# Patient Record
Sex: FEMALE | Race: OTHER | HISPANIC OR LATINO | Employment: PART TIME | URBAN - METROPOLITAN AREA
[De-identification: names, ages, dates, MRNs, and addresses within clinical notes are randomized per-mention and may not be internally consistent; named-entity substitution may affect disease eponyms.]

---

## 2019-12-16 ENCOUNTER — LAB VISIT (OUTPATIENT)
Dept: LAB | Facility: HOSPITAL | Age: 29
End: 2019-12-16
Attending: MIDWIFE
Payer: MEDICAID

## 2019-12-16 ENCOUNTER — OFFICE VISIT (OUTPATIENT)
Dept: OBSTETRICS AND GYNECOLOGY | Facility: CLINIC | Age: 29
End: 2019-12-16
Payer: MEDICAID

## 2019-12-16 VITALS
SYSTOLIC BLOOD PRESSURE: 112 MMHG | BODY MASS INDEX: 41.42 KG/M2 | WEIGHT: 257.69 LBS | DIASTOLIC BLOOD PRESSURE: 76 MMHG | HEIGHT: 66 IN

## 2019-12-16 DIAGNOSIS — Z32.01 POSITIVE PREGNANCY TEST: ICD-10-CM

## 2019-12-16 DIAGNOSIS — N91.2 AMENORRHEA: Primary | ICD-10-CM

## 2019-12-16 DIAGNOSIS — O26.841 UTERINE SIZE-DATE DISCREPANCY IN FIRST TRIMESTER: ICD-10-CM

## 2019-12-16 LAB
ABO + RH BLD: NORMAL
BASOPHILS # BLD AUTO: 0.05 K/UL (ref 0–0.2)
BASOPHILS NFR BLD: 0.5 % (ref 0–1.9)
BLD GP AB SCN CELLS X3 SERPL QL: NORMAL
DIFFERENTIAL METHOD: ABNORMAL
EOSINOPHIL # BLD AUTO: 0.1 K/UL (ref 0–0.5)
EOSINOPHIL NFR BLD: 0.6 % (ref 0–8)
ERYTHROCYTE [DISTWIDTH] IN BLOOD BY AUTOMATED COUNT: 14 % (ref 11.5–14.5)
HCT VFR BLD AUTO: 41.3 % (ref 37–48.5)
HGB BLD-MCNC: 13 G/DL (ref 12–16)
IMM GRANULOCYTES # BLD AUTO: 0.02 K/UL (ref 0–0.04)
IMM GRANULOCYTES NFR BLD AUTO: 0.2 % (ref 0–0.5)
LYMPHOCYTES # BLD AUTO: 2.5 K/UL (ref 1–4.8)
LYMPHOCYTES NFR BLD: 23.8 % (ref 18–48)
MCH RBC QN AUTO: 29.8 PG (ref 27–31)
MCHC RBC AUTO-ENTMCNC: 31.5 G/DL (ref 32–36)
MCV RBC AUTO: 95 FL (ref 82–98)
MONOCYTES # BLD AUTO: 0.8 K/UL (ref 0.3–1)
MONOCYTES NFR BLD: 7.5 % (ref 4–15)
NEUTROPHILS # BLD AUTO: 7 K/UL (ref 1.8–7.7)
NEUTROPHILS NFR BLD: 67.4 % (ref 38–73)
NRBC BLD-RTO: 0 /100 WBC
PLATELET # BLD AUTO: 240 K/UL (ref 150–350)
PMV BLD AUTO: 11.3 FL (ref 9.2–12.9)
RBC # BLD AUTO: 4.36 M/UL (ref 4–5.4)
WBC # BLD AUTO: 10.32 K/UL (ref 3.9–12.7)

## 2019-12-16 PROCEDURE — 86592 SYPHILIS TEST NON-TREP QUAL: CPT

## 2019-12-16 PROCEDURE — 99999 PR PBB SHADOW E&M-NEW PATIENT-LVL III: CPT | Mod: PBBFAC,,, | Performed by: MIDWIFE

## 2019-12-16 PROCEDURE — 99202 PR OFFICE/OUTPT VISIT, NEW, LEVL II, 15-29 MIN: ICD-10-PCS | Mod: S$PBB,TH,, | Performed by: MIDWIFE

## 2019-12-16 PROCEDURE — 86703 HIV-1/HIV-2 1 RESULT ANTBDY: CPT

## 2019-12-16 PROCEDURE — 86850 RBC ANTIBODY SCREEN: CPT

## 2019-12-16 PROCEDURE — 86762 RUBELLA ANTIBODY: CPT

## 2019-12-16 PROCEDURE — 87086 URINE CULTURE/COLONY COUNT: CPT

## 2019-12-16 PROCEDURE — 99202 OFFICE O/P NEW SF 15 MIN: CPT | Mod: S$PBB,TH,, | Performed by: MIDWIFE

## 2019-12-16 PROCEDURE — 36415 COLL VENOUS BLD VENIPUNCTURE: CPT

## 2019-12-16 PROCEDURE — 87340 HEPATITIS B SURFACE AG IA: CPT

## 2019-12-16 PROCEDURE — 99203 OFFICE O/P NEW LOW 30 MIN: CPT | Mod: PBBFAC,TH,25 | Performed by: MIDWIFE

## 2019-12-16 PROCEDURE — 99999 PR PBB SHADOW E&M-NEW PATIENT-LVL III: ICD-10-PCS | Mod: PBBFAC,,, | Performed by: MIDWIFE

## 2019-12-16 PROCEDURE — 84702 CHORIONIC GONADOTROPIN TEST: CPT

## 2019-12-16 PROCEDURE — 85025 COMPLETE CBC W/AUTO DIFF WBC: CPT

## 2019-12-16 NOTE — PROGRESS NOTES
"Subjective:      Naseem Moura is a 29 y.o. female who presents for evaluation of menstrual irregularity. She believes she could be pregnant. Pregnancy is desired. Sexual Activity: single partner, contraception: none. Current symptoms also include: fatigue and positive home pregnancy test. Last period was abnormal.     Patient's last menstrual period was 2019 (exact date). Reports neg UPT at home on 10/28, spotting on  with neg home UPT, spotting again on , +home UPT   The following portions of the patient's history were reviewed and updated as appropriate: allergies, current medications, past family history, past medical history, past social history, past surgical history and problem list.    Review of Systems  Pertinent items are noted in HPI.       Objective:      /76   Ht 5' 6" (1.676 m)   Wt 116.9 kg (257 lb 11.5 oz)   LMP 2019 (Exact Date)   BMI 41.60 kg/m²   General: alert, appears stated age, cooperative, no distress and no acute distress      Lab Review  Urine HCG: positive      Assessment:      Absence of menstruation.       Plan:      Pregnancy Test: Positive: EDC: unsure. Briefly discussed pre- care options. Pregnancy, Childbirth and the [place] book given. Encouraged well-balanced diet, plenty of rest when needed, pre-juhi vitamins daily and walking for exercise. Discussed self-help for nausea, avoiding OTC medications until consulting provider or pharmacist, other than Tylenol as needed, minimal caffeine (1-2 cups daily) and avoiding alcohol. She will schedule her initial OB visit in the next month with her PCP or OB provider. Feel free to call with any questions. other    New OB labs with beta HCG  Dating US in 3 weeks  GC/CH at next visit  Reviewed Midwife/collaborative MD practice  Reviewed diet recommendations  PNV daily  "

## 2019-12-17 DIAGNOSIS — Z32.01 POSITIVE PREGNANCY TEST: Primary | ICD-10-CM

## 2019-12-17 LAB
BACTERIA UR CULT: NO GROWTH
HBV SURFACE AG SERPL QL IA: NEGATIVE
HCG INTACT+B SERPL-ACNC: NORMAL MIU/ML
HIV 1+2 AB+HIV1 P24 AG SERPL QL IA: NEGATIVE
RPR SER QL: NORMAL
RUBV IGG SER-ACNC: 119 IU/ML
RUBV IGG SER-IMP: REACTIVE

## 2019-12-19 ENCOUNTER — LAB VISIT (OUTPATIENT)
Dept: LAB | Facility: HOSPITAL | Age: 29
End: 2019-12-19
Attending: MIDWIFE
Payer: MEDICAID

## 2019-12-19 DIAGNOSIS — Z32.01 POSITIVE PREGNANCY TEST: ICD-10-CM

## 2019-12-19 PROCEDURE — 36415 COLL VENOUS BLD VENIPUNCTURE: CPT

## 2019-12-19 PROCEDURE — 84702 CHORIONIC GONADOTROPIN TEST: CPT

## 2019-12-20 LAB — HCG INTACT+B SERPL-ACNC: NORMAL MIU/ML

## 2019-12-23 ENCOUNTER — PATIENT MESSAGE (OUTPATIENT)
Dept: OBSTETRICS AND GYNECOLOGY | Facility: CLINIC | Age: 29
End: 2019-12-23

## 2020-01-08 ENCOUNTER — PATIENT MESSAGE (OUTPATIENT)
Dept: OBSTETRICS AND GYNECOLOGY | Facility: CLINIC | Age: 30
End: 2020-01-08

## 2020-02-03 ENCOUNTER — INITIAL PRENATAL (OUTPATIENT)
Dept: OBSTETRICS AND GYNECOLOGY | Facility: CLINIC | Age: 30
End: 2020-02-03
Payer: MEDICAID

## 2020-02-03 ENCOUNTER — PROCEDURE VISIT (OUTPATIENT)
Dept: OBSTETRICS AND GYNECOLOGY | Facility: CLINIC | Age: 30
End: 2020-02-03
Payer: MEDICAID

## 2020-02-03 VITALS
DIASTOLIC BLOOD PRESSURE: 78 MMHG | BODY MASS INDEX: 41.28 KG/M2 | SYSTOLIC BLOOD PRESSURE: 132 MMHG | WEIGHT: 255.75 LBS

## 2020-02-03 DIAGNOSIS — Z32.01 POSITIVE PREGNANCY TEST: ICD-10-CM

## 2020-02-03 DIAGNOSIS — Z3A.14 14 WEEKS GESTATION OF PREGNANCY: Primary | ICD-10-CM

## 2020-02-03 DIAGNOSIS — O26.841 UTERINE SIZE-DATE DISCREPANCY IN FIRST TRIMESTER: ICD-10-CM

## 2020-02-03 PROCEDURE — 76801 OB US < 14 WKS SINGLE FETUS: CPT | Mod: PBBFAC | Performed by: OBSTETRICS & GYNECOLOGY

## 2020-02-03 PROCEDURE — 87491 CHLMYD TRACH DNA AMP PROBE: CPT

## 2020-02-03 PROCEDURE — 76801 PR US, OB <14WKS, TRANSABD, SINGLE GESTATION: ICD-10-PCS | Mod: 26,S$PBB,, | Performed by: OBSTETRICS & GYNECOLOGY

## 2020-02-03 PROCEDURE — 99213 OFFICE O/P EST LOW 20 MIN: CPT | Mod: PBBFAC,25 | Performed by: MIDWIFE

## 2020-02-03 PROCEDURE — 88175 CYTOPATH C/V AUTO FLUID REDO: CPT

## 2020-02-03 PROCEDURE — 99213 PR OFFICE/OUTPT VISIT, EST, LEVL III, 20-29 MIN: ICD-10-PCS | Mod: S$PBB,TH,, | Performed by: MIDWIFE

## 2020-02-03 PROCEDURE — 99999 PR PBB SHADOW E&M-EST. PATIENT-LVL III: ICD-10-PCS | Mod: PBBFAC,,, | Performed by: MIDWIFE

## 2020-02-03 PROCEDURE — 76816 OB US FOLLOW-UP PER FETUS: CPT | Mod: PBBFAC | Performed by: OBSTETRICS & GYNECOLOGY

## 2020-02-03 PROCEDURE — 99999 PR PBB SHADOW E&M-EST. PATIENT-LVL III: CPT | Mod: PBBFAC,,, | Performed by: MIDWIFE

## 2020-02-03 PROCEDURE — 76801 OB US < 14 WKS SINGLE FETUS: CPT | Mod: 26,S$PBB,, | Performed by: OBSTETRICS & GYNECOLOGY

## 2020-02-03 PROCEDURE — 87624 HPV HI-RISK TYP POOLED RSLT: CPT

## 2020-02-03 PROCEDURE — 99213 OFFICE O/P EST LOW 20 MIN: CPT | Mod: S$PBB,TH,, | Performed by: MIDWIFE

## 2020-02-03 RX ORDER — PROGESTERONE 200 MG/1
200 CAPSULE ORAL DAILY
COMMUNITY
End: 2020-03-18 | Stop reason: HOSPADM

## 2020-02-03 NOTE — PROGRESS NOTES
Labs done at Risk Assessment, reviewed  Patient here for New OB, used language line for some communication, speaks English well  Patient states she started PNC in [place], had 4 U/S's, was given the ARANZA of 07/30/20, had some spotting, and was put on a 30 day course of Progesterone  Patient's only OB history is a 4 week miscarriage, no living children  Discussed history with Dr. SHAGUFTA Teixeira, OK to stop Progesterone   Viewed paperwork from [place], will scan into Epic, ARANZA @ 9 weeks GA:7/30/20 by U/S  Zika precautions, recent travel to [place], denies symptoms, states wears inspect repellent band, no plans for further travel outside [place]  Was told by MD in [place] she was high risk for pre-e and to start baby ASA @ 16 weeks  U/S today: confirms original ARANZA of 07/30/2020  Physical exam, Pap with HPV testing, and Gen Probes done  Precautions reviewed  RTC in 4 weeks or PRN

## 2020-02-04 LAB
C TRACH DNA SPEC QL NAA+PROBE: NOT DETECTED
N GONORRHOEA DNA SPEC QL NAA+PROBE: NOT DETECTED

## 2020-02-07 ENCOUNTER — HOSPITAL ENCOUNTER (EMERGENCY)
Facility: HOSPITAL | Age: 30
Discharge: HOME | End: 2020-02-08
Attending: EMERGENCY MEDICINE
Payer: MEDICAID

## 2020-02-07 DIAGNOSIS — N39.0 ACUTE UTI (URINARY TRACT INFECTION): Primary | ICD-10-CM

## 2020-02-07 DIAGNOSIS — R10.2 PELVIC CRAMPING: ICD-10-CM

## 2020-02-07 DIAGNOSIS — K59.00 CONSTIPATION, UNSPECIFIED CONSTIPATION TYPE: ICD-10-CM

## 2020-02-07 PROCEDURE — 99284 EMERGENCY DEPT VISIT MOD MDM: CPT

## 2020-02-07 RX ORDER — METOCLOPRAMIDE HYDROCHLORIDE 5 MG/ML
10 INJECTION INTRAMUSCULAR; INTRAVENOUS
Status: DISCONTINUED | OUTPATIENT
Start: 2020-02-07 | End: 2020-02-08

## 2020-02-08 VITALS
RESPIRATION RATE: 16 BRPM | HEIGHT: 66 IN | HEART RATE: 82 BPM | WEIGHT: 254.44 LBS | BODY MASS INDEX: 40.89 KG/M2 | SYSTOLIC BLOOD PRESSURE: 117 MMHG | TEMPERATURE: 99 F | DIASTOLIC BLOOD PRESSURE: 56 MMHG | OXYGEN SATURATION: 100 %

## 2020-02-08 LAB
ALBUMIN SERPL BCP-MCNC: 3.4 G/DL (ref 3.5–5.2)
ALP SERPL-CCNC: 75 U/L (ref 55–135)
ALT SERPL W/O P-5'-P-CCNC: 43 U/L (ref 10–44)
ANION GAP SERPL CALC-SCNC: 9 MMOL/L (ref 8–16)
AST SERPL-CCNC: 25 U/L (ref 10–40)
BACTERIA #/AREA URNS HPF: ABNORMAL /HPF
BASOPHILS # BLD AUTO: 0.06 K/UL (ref 0–0.2)
BASOPHILS NFR BLD: 0.5 % (ref 0–1.9)
BILIRUB SERPL-MCNC: 0.2 MG/DL (ref 0.1–1)
BILIRUB UR QL STRIP: NEGATIVE
BUN SERPL-MCNC: 7 MG/DL (ref 6–20)
CALCIUM SERPL-MCNC: 9.5 MG/DL (ref 8.7–10.5)
CHLORIDE SERPL-SCNC: 106 MMOL/L (ref 95–110)
CLARITY UR: CLEAR
CO2 SERPL-SCNC: 20 MMOL/L (ref 23–29)
COLOR UR: YELLOW
CREAT SERPL-MCNC: 0.6 MG/DL (ref 0.5–1.4)
DIFFERENTIAL METHOD: NORMAL
EOSINOPHIL # BLD AUTO: 0.2 K/UL (ref 0–0.5)
EOSINOPHIL NFR BLD: 1.2 % (ref 0–8)
ERYTHROCYTE [DISTWIDTH] IN BLOOD BY AUTOMATED COUNT: 13.2 % (ref 11.5–14.5)
EST. GFR  (AFRICAN AMERICAN): >60 ML/MIN/1.73 M^2
EST. GFR  (NON AFRICAN AMERICAN): >60 ML/MIN/1.73 M^2
GLUCOSE SERPL-MCNC: 95 MG/DL (ref 70–110)
GLUCOSE UR QL STRIP: NEGATIVE
HCG INTACT+B SERPL-ACNC: NORMAL MIU/ML
HCT VFR BLD AUTO: 39.5 % (ref 37–48.5)
HGB BLD-MCNC: 13.1 G/DL (ref 12–16)
HGB UR QL STRIP: ABNORMAL
IMM GRANULOCYTES # BLD AUTO: 0.04 K/UL (ref 0–0.04)
IMM GRANULOCYTES NFR BLD AUTO: 0.3 % (ref 0–0.5)
KETONES UR QL STRIP: ABNORMAL
LEUKOCYTE ESTERASE UR QL STRIP: ABNORMAL
LYMPHOCYTES # BLD AUTO: 3.7 K/UL (ref 1–4.8)
LYMPHOCYTES NFR BLD: 30 % (ref 18–48)
MCH RBC QN AUTO: 30.2 PG (ref 27–31)
MCHC RBC AUTO-ENTMCNC: 33.2 G/DL (ref 32–36)
MCV RBC AUTO: 91 FL (ref 82–98)
MICROSCOPIC COMMENT: ABNORMAL
MONOCYTES # BLD AUTO: 0.9 K/UL (ref 0.3–1)
MONOCYTES NFR BLD: 7 % (ref 4–15)
NEUTROPHILS # BLD AUTO: 7.4 K/UL (ref 1.8–7.7)
NEUTROPHILS NFR BLD: 61 % (ref 38–73)
NITRITE UR QL STRIP: NEGATIVE
NRBC BLD-RTO: 0 /100 WBC
PH UR STRIP: 6 [PH] (ref 5–8)
PLATELET # BLD AUTO: 263 K/UL (ref 150–350)
PMV BLD AUTO: 11.5 FL (ref 9.2–12.9)
POTASSIUM SERPL-SCNC: 4 MMOL/L (ref 3.5–5.1)
PROT SERPL-MCNC: 7.6 G/DL (ref 6–8.4)
PROT UR QL STRIP: NEGATIVE
RBC # BLD AUTO: 4.34 M/UL (ref 4–5.4)
RBC #/AREA URNS HPF: 1 /HPF (ref 0–4)
SODIUM SERPL-SCNC: 135 MMOL/L (ref 136–145)
SP GR UR STRIP: 1.02 (ref 1–1.03)
SQUAMOUS #/AREA URNS HPF: 5 /HPF
URN SPEC COLLECT METH UR: ABNORMAL
UROBILINOGEN UR STRIP-ACNC: NEGATIVE EU/DL
WBC # BLD AUTO: 12.21 K/UL (ref 3.9–12.7)
WBC #/AREA URNS HPF: 7 /HPF (ref 0–5)

## 2020-02-08 PROCEDURE — 85025 COMPLETE CBC W/AUTO DIFF WBC: CPT

## 2020-02-08 PROCEDURE — 80053 COMPREHEN METABOLIC PANEL: CPT

## 2020-02-08 PROCEDURE — 84702 CHORIONIC GONADOTROPIN TEST: CPT

## 2020-02-08 PROCEDURE — 25000003 PHARM REV CODE 250: Performed by: EMERGENCY MEDICINE

## 2020-02-08 PROCEDURE — 81000 URINALYSIS NONAUTO W/SCOPE: CPT

## 2020-02-08 RX ORDER — ACETAMINOPHEN 325 MG/1
650 TABLET ORAL
Status: COMPLETED | OUTPATIENT
Start: 2020-02-08 | End: 2020-02-08

## 2020-02-08 RX ORDER — CEPHALEXIN 500 MG/1
500 CAPSULE ORAL 4 TIMES DAILY
Qty: 20 CAPSULE | Refills: 0 | Status: SHIPPED | OUTPATIENT
Start: 2020-02-08 | End: 2020-02-13

## 2020-02-08 RX ORDER — DOCUSATE SODIUM 100 MG/1
100 CAPSULE, LIQUID FILLED ORAL 2 TIMES DAILY PRN
Qty: 30 CAPSULE | Refills: 0 | Status: SHIPPED | OUTPATIENT
Start: 2020-02-08 | End: 2020-03-18

## 2020-02-08 RX ADMIN — ACETAMINOPHEN 650 MG: 325 TABLET ORAL at 12:02

## 2020-02-08 NOTE — ED PROVIDER NOTES
2020, 10:44 PM   History obtained from the patient      History of Present Illness: Naseme Moura is a 29 y.o. female patient with is a  and currently 15 weeks gestation presenting with lower abdominal pain.    10:45 PM: Pt was placed back in [facility]. I explained to pt that due to lack of available rooms pt was seen by myself to begin the workup. Pt understands they will be seen and dispositioned by the next available physician. Pt voices understanding and agrees with plan. Pt also understands the longer than normal wait time. I am removing myself from the care of pt and pt will now be assigned to the next available physician.       SCRIBE #1 NOTE: I, Herminio Wood, am scribing for, and in the presence of, Fred Vizcarra MD. I have scribed the entire note.       History     Chief Complaint   Patient presents with    Abdominal Pain     generalized lower abd cramping that began this morning; 15wks pregnant; denies vaginal bleeding     Review of patient's allergies indicates:  No Known Allergies      History of Present Illness     HPI    2020, 11:53 PM  History obtained from the patient      History of Present Illness: Naseem Moura is a 29 y.o. female pregnant patient (15 weeks) with a PMHx of miscarriage (once; at 4 weeks) who presents to the Emergency Department for evaluation of lower abdominal pain and cramping which onset gradually this morning. Symptoms are constant and moderate in severity. No mitigating or exacerbating factors reported. Associated sxs include constipation (last BM yesterday). Patient denies any dysuria, vaginal bleeding, vaginal discharge, N/V, HA, dizziness, SOB, and all other sxs at this time. No prior Tx reported. No further complaints or concerns at this time.       Arrival mode: Personal vehicle    PCP: Primary Doctor No       Past Medical History:  Miscarriage    Past Surgical History:  History reviewed. No pertinent surgical history.        Family  History:  Family History   Problem Relation Age of Onset    Hypertension Mother        Social History:  Social History     Tobacco Use    Smoking status: Never Smoker    Smokeless tobacco: Never Used   Substance and Sexual Activity    Alcohol use: Never     Frequency: Never    Drug use: Never    Sexual activity: Yes     Partners: Male        Review of Systems     Review of Systems   Constitutional: Negative for chills and fever.   HENT: Negative for sore throat.    Respiratory: Negative for cough and shortness of breath.    Cardiovascular: Negative for chest pain and leg swelling.   Gastrointestinal: Positive for abdominal pain (Lower; cramping) and constipation. Negative for diarrhea, nausea and vomiting.   Genitourinary: Negative for dysuria, vaginal bleeding and vaginal discharge.   Musculoskeletal: Negative for back pain, neck pain and neck stiffness.   Skin: Negative for rash and wound.   Neurological: Negative for dizziness, weakness, light-headedness, numbness and headaches.   Hematological: Does not bruise/bleed easily.   All other systems reviewed and are negative.     Physical Exam     Initial Vitals [02/07/20 2205]   BP Pulse Resp Temp SpO2   (!) 144/83 79 16 98.5 °F (36.9 °C) 100 %      MAP       --          Physical Exam  Nursing Notes and Vital Signs Reviewed.  Constitutional: Patient is in no acute distress. Well-developed and well-nourished.  Head: Atraumatic. Normocephalic.  Eyes: PERRL. EOM intact. Conjunctivae are not pale. No scleral icterus.  ENT: Mucous membranes are moist. Oropharynx is clear and symmetric.    Neck: Supple. Full ROM. No lymphadenopathy.  Cardiovascular: Regular rate. Regular rhythm. No murmurs, rubs, or gallops. Distal pulses are 2+ and symmetric.  Pulmonary/Chest: No respiratory distress. Clear to auscultation bilaterally. No wheezing or rales.  Abdominal: Soft and non-distended. There is mild mildine suprapubic tenderness to palpation. No rebound, guarding, or  "rigidity. Good bowel sounds.  Genitourinary: No CVA tenderness. Gravid uterus.  Musculoskeletal: Moves all extremities. No obvious deformities. No edema. No calf tenderness.  Skin: Warm and dry.  Neurological:  Alert, awake, and appropriate.  Normal speech.  No acute focal neurological deficits are appreciated.  Psychiatric: Normal affect. Good eye contact. Appropriate in content.     ED Course   Procedures  ED Vital Signs:  Vitals:    02/07/20 2205 02/08/20 0053 02/08/20 0239   BP: (!) 144/83 (!) 117/56    Pulse: 79 86 82   Resp: 16 16 16   Temp: 98.5 °F (36.9 °C)     TempSrc: Oral     SpO2: 100% 100% 100%   Weight: 115.4 kg (254 lb 6.6 oz)     Height: 5' 6" (1.676 m)         Abnormal Lab Results:  Labs Reviewed   COMPREHENSIVE METABOLIC PANEL - Abnormal; Notable for the following components:       Result Value    Sodium 135 (*)     CO2 20 (*)     Albumin 3.4 (*)     All other components within normal limits   URINALYSIS, REFLEX TO URINE CULTURE - Abnormal; Notable for the following components:    Ketones, UA Trace (*)     Occult Blood UA Trace (*)     Leukocytes, UA 1+ (*)     All other components within normal limits    Narrative:     Preferred Collection Type->Urine, Clean Catch   URINALYSIS MICROSCOPIC - Abnormal; Notable for the following components:    WBC, UA 7 (*)     Bacteria Few (*)     All other components within normal limits    Narrative:     Preferred Collection Type->Urine, Clean Catch   CBC W/ AUTO DIFFERENTIAL   HCG, QUANTITATIVE, PREGNANCY        All Lab Results:  Results for orders placed or performed during the hospital encounter of 02/07/20   CBC auto differential   Result Value Ref Range    WBC 12.21 3.90 - 12.70 K/uL    RBC 4.34 4.00 - 5.40 M/uL    Hemoglobin 13.1 12.0 - 16.0 g/dL    Hematocrit 39.5 37.0 - 48.5 %    Mean Corpuscular Volume 91 82 - 98 fL    Mean Corpuscular Hemoglobin 30.2 27.0 - 31.0 pg    Mean Corpuscular Hemoglobin Conc 33.2 32.0 - 36.0 g/dL    RDW 13.2 11.5 - 14.5 %    " Platelets 263 150 - 350 K/uL    MPV 11.5 9.2 - 12.9 fL    Immature Granulocytes 0.3 0.0 - 0.5 %    Gran # (ANC) 7.4 1.8 - 7.7 K/uL    Immature Grans (Abs) 0.04 0.00 - 0.04 K/uL    Lymph # 3.7 1.0 - 4.8 K/uL    Mono # 0.9 0.3 - 1.0 K/uL    Eos # 0.2 0.0 - 0.5 K/uL    Baso # 0.06 0.00 - 0.20 K/uL    nRBC 0 0 /100 WBC    Gran% 61.0 38.0 - 73.0 %    Lymph% 30.0 18.0 - 48.0 %    Mono% 7.0 4.0 - 15.0 %    Eosinophil% 1.2 0.0 - 8.0 %    Basophil% 0.5 0.0 - 1.9 %    Differential Method Automated    Comprehensive metabolic panel   Result Value Ref Range    Sodium 135 (L) 136 - 145 mmol/L    Potassium 4.0 3.5 - 5.1 mmol/L    Chloride 106 95 - 110 mmol/L    CO2 20 (L) 23 - 29 mmol/L    Glucose 95 70 - 110 mg/dL    BUN, Bld 7 6 - 20 mg/dL    Creatinine 0.6 0.5 - 1.4 mg/dL    Calcium 9.5 8.7 - 10.5 mg/dL    Total Protein 7.6 6.0 - 8.4 g/dL    Albumin 3.4 (L) 3.5 - 5.2 g/dL    Total Bilirubin 0.2 0.1 - 1.0 mg/dL    Alkaline Phosphatase 75 55 - 135 U/L    AST 25 10 - 40 U/L    ALT 43 10 - 44 U/L    Anion Gap 9 8 - 16 mmol/L    eGFR if African American >60 >60 mL/min/1.73 m^2    eGFR if non African American >60 >60 mL/min/1.73 m^2   Urinalysis, Reflex to Urine Culture Urine, Clean Catch   Result Value Ref Range    Specimen UA Urine, Clean Catch     Color, UA Yellow Yellow, Straw, Sarah    Appearance, UA Clear Clear    pH, UA 6.0 5.0 - 8.0    Specific Gravity, UA 1.025 1.005 - 1.030    Protein, UA Negative Negative    Glucose, UA Negative Negative    Ketones, UA Trace (A) Negative    Bilirubin (UA) Negative Negative    Occult Blood UA Trace (A) Negative    Nitrite, UA Negative Negative    Urobilinogen, UA Negative <2.0 EU/dL    Leukocytes, UA 1+ (A) Negative   hCG, quantitative, pregnancy   Result Value Ref Range    hCG Quant 01304 See Text mIU/mL   Urinalysis Microscopic   Result Value Ref Range    RBC, UA 1 0 - 4 /hpf    WBC, UA 7 (H) 0 - 5 /hpf    Bacteria Few (A) None-Occ /hpf    Squam Epithel, UA 5 /hpf    Microscopic Comment  SEE COMMENT        Imaging Results:  Imaging Results    None        Bedside transabdominal OB ultrasound by Dr. Vizcarra: Healthy IUP noted. . Good fetal movement.  No abnormality visualized.           The Emergency Provider reviewed the vital signs and test results, which are outlined above.     ED Discussion     2:37 AM: Reassessed pt at this time. Discussed with pt all pertinent ED information and results. Discussed pt dx and plan of tx. Gave pt all f/u and return to the ED instructions. All questions and concerns were addressed at this time. Pt expresses understanding of information and instructions, and is comfortable with plan to discharge. Pt is stable for discharge.    I discussed with patient and/or family/caretaker that evaluation in the ED does not suggest any emergent or life threatening medical conditions requiring immediate intervention beyond what was provided in the ED, and I believe patient is safe for discharge.  Regardless, an unremarkable evaluation in the ED does not preclude the development or presence of a serious of life threatening condition. As such, patient was instructed to return immediately for any worsening or change in current symptoms.       Medical Decision Making:   Clinical Tests:   Lab Tests: Ordered and Reviewed           ED Medication(s):  Medications   acetaminophen tablet 650 mg (650 mg Oral Given 2/8/20 0030)       Discharge Medication List as of 2/8/2020  2:34 AM      START taking these medications    Details   cephALEXin (KEFLEX) 500 MG capsule Take 1 capsule (500 mg total) by mouth 4 (four) times daily. for 5 days, Starting Sat 2/8/2020, Until Thu 2/13/2020, Print      docusate sodium (COLACE) 100 MG capsule Take 1 capsule (100 mg total) by mouth 2 (two) times daily as needed for Constipation., Starting Sat 2/8/2020, Print                     Scribe Attestation:   Scribe #1: I performed the above scribed service and the documentation accurately describes the  services I performed. I attest to the accuracy of the note.     Attending:   Physician Attestation Statement for Scribe #1: I, Fred Vizcarra MD, personally performed the services described in this documentation, as scribed by Herminio Wood, in my presence, and it is both accurate and complete.           Clinical Impression       ICD-10-CM ICD-9-CM   1. Acute UTI (urinary tract infection) N39.0 599.0   2. Constipation, unspecified constipation type K59.00 564.00   3. Pelvic cramping R10.2 625.9       Disposition:   Disposition: Discharged  Condition: Stable             Fred Vizcarra MD  02/08/20 0536       Fred Vizcarra MD  02/08/20 0540

## 2020-02-08 NOTE — ED NOTES
DC indicated per MD. DC instructions explained to pt., who verbalized understanding. NAD, VSS, resp. E/u. AAOx4. Ambulatory out of ED with even, steady gait; accompanied by spouse. Copy of DC instructions provided to registration team member to give to patient with checkout. Pt. At registration desk for checkout.

## 2020-02-10 LAB
HPV HR 12 DNA SPEC QL NAA+PROBE: POSITIVE
HPV16 AG SPEC QL: NEGATIVE
HPV18 DNA SPEC QL NAA+PROBE: POSITIVE

## 2020-02-29 LAB
FINAL PATHOLOGIC DIAGNOSIS: NORMAL
Lab: NORMAL

## 2020-03-03 ENCOUNTER — PATIENT MESSAGE (OUTPATIENT)
Dept: OBSTETRICS AND GYNECOLOGY | Facility: CLINIC | Age: 30
End: 2020-03-03

## 2020-03-03 ENCOUNTER — TELEPHONE (OUTPATIENT)
Dept: OBSTETRICS AND GYNECOLOGY | Facility: CLINIC | Age: 30
End: 2020-03-03

## 2020-03-03 DIAGNOSIS — B96.89 BACTERIAL VAGINOSIS IN PREGNANCY: Primary | ICD-10-CM

## 2020-03-03 DIAGNOSIS — O23.599 BACTERIAL VAGINOSIS IN PREGNANCY: Primary | ICD-10-CM

## 2020-03-03 DIAGNOSIS — Z34.92 NORMAL PREGNANCY IN SECOND TRIMESTER: ICD-10-CM

## 2020-03-03 DIAGNOSIS — R87.618 OTHER ABNORMAL CYTOLOGICAL FINDING OF SPECIMEN FROM CERVIX: ICD-10-CM

## 2020-03-03 PROBLEM — R87.619 ABNORMAL PAP SMEAR OF CERVIX: Status: ACTIVE | Noted: 2020-03-03

## 2020-03-03 RX ORDER — METRONIDAZOLE 500 MG/1
500 TABLET ORAL EVERY 12 HOURS
Qty: 14 TABLET | Refills: 0 | Status: SHIPPED | OUTPATIENT
Start: 2020-03-03 | End: 2020-03-10

## 2020-03-03 NOTE — TELEPHONE ENCOUNTER
----- Message from Moon Nj CNM sent at 3/3/2020  1:31 AM CST -----  Regarding: Abnormal PAP  Ms. 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 She needs a colposcopy scheduled. She also tested positive for BV. Please let her know I sent a prescription to her pharmacy. Thank you.

## 2020-03-13 ENCOUNTER — PATIENT MESSAGE (OUTPATIENT)
Dept: OBSTETRICS AND GYNECOLOGY | Facility: CLINIC | Age: 30
End: 2020-03-13

## 2020-03-18 ENCOUNTER — PROCEDURE VISIT (OUTPATIENT)
Dept: OBSTETRICS AND GYNECOLOGY | Facility: CLINIC | Age: 30
End: 2020-03-18
Payer: MEDICAID

## 2020-03-18 ENCOUNTER — PATIENT MESSAGE (OUTPATIENT)
Dept: ADMINISTRATIVE | Facility: OTHER | Age: 30
End: 2020-03-18

## 2020-03-18 VITALS — DIASTOLIC BLOOD PRESSURE: 69 MMHG | BODY MASS INDEX: 45.07 KG/M2 | HEIGHT: 63 IN | SYSTOLIC BLOOD PRESSURE: 112 MMHG

## 2020-03-18 DIAGNOSIS — Z34.92 NORMAL PREGNANCY IN SECOND TRIMESTER: Primary | ICD-10-CM

## 2020-03-18 DIAGNOSIS — O35.EXX0 PYELECTASIS OF FETUS ON PRENATAL ULTRASOUND: ICD-10-CM

## 2020-03-18 DIAGNOSIS — Z36.3 ANTENATAL SCREENING FOR MALFORMATION USING ULTRASONICS: ICD-10-CM

## 2020-03-18 DIAGNOSIS — O99.210 OBESITY IN PREGNANCY: ICD-10-CM

## 2020-03-18 DIAGNOSIS — R87.810 CERVICAL HIGH RISK HPV (HUMAN PAPILLOMAVIRUS) TEST POSITIVE: ICD-10-CM

## 2020-03-18 PROBLEM — R87.619 ABNORMAL PAP SMEAR OF CERVIX: Status: RESOLVED | Noted: 2020-03-03 | Resolved: 2020-03-18

## 2020-03-18 PROBLEM — B96.89 BACTERIAL VAGINOSIS IN PREGNANCY: Status: RESOLVED | Noted: 2020-03-03 | Resolved: 2020-03-18

## 2020-03-18 PROBLEM — O23.599 BACTERIAL VAGINOSIS IN PREGNANCY: Status: RESOLVED | Noted: 2020-03-03 | Resolved: 2020-03-18

## 2020-03-18 PROCEDURE — 57452 EXAM OF CERVIX W/SCOPE: CPT | Mod: PBBFAC | Performed by: OBSTETRICS & GYNECOLOGY

## 2020-03-18 PROCEDURE — 99212 PR OFFICE/OUTPT VISIT, EST, LEVL II, 10-19 MIN: ICD-10-PCS | Mod: TH,25,S$PBB, | Performed by: OBSTETRICS & GYNECOLOGY

## 2020-03-18 PROCEDURE — 57452 COLPOSCOPY-TODAY: ICD-10-PCS | Mod: S$PBB,,, | Performed by: OBSTETRICS & GYNECOLOGY

## 2020-03-18 PROCEDURE — 76805 OB US >/= 14 WKS SNGL FETUS: CPT | Mod: 26,S$PBB,, | Performed by: OBSTETRICS & GYNECOLOGY

## 2020-03-18 PROCEDURE — 99212 OFFICE O/P EST SF 10 MIN: CPT | Mod: TH,25,S$PBB, | Performed by: OBSTETRICS & GYNECOLOGY

## 2020-03-18 PROCEDURE — 76805 PR US, OB 14+WKS, TRANSABD, SINGLE GESTATION: ICD-10-PCS | Mod: 26,S$PBB,, | Performed by: OBSTETRICS & GYNECOLOGY

## 2020-03-18 PROCEDURE — 76805 OB US >/= 14 WKS SNGL FETUS: CPT | Mod: PBBFAC | Performed by: OBSTETRICS & GYNECOLOGY

## 2020-03-18 RX ORDER — METRONIDAZOLE 500 MG/1
TABLET ORAL
COMMUNITY
Start: 2020-03-17 | End: 2020-04-15

## 2020-03-18 RX ORDER — ASPIRIN 81 MG/1
81 TABLET ORAL DAILY
Start: 2020-03-18 | End: 2020-06-19 | Stop reason: SDUPTHER

## 2020-03-18 NOTE — PATIENT INSTRUCTIONS
Consejos para tener en cuenta fabio el embarazo  Hable con ritter proveedor de atención médica antes de usar algún medicamento calmante del dolor (analgésico) en cualquier momento fabio rittre embarazo  Consejos para el primer trimestre  Náuseas  · Levántese despacio. Coma algunas galletas saladas antes de levantarse de la cama.  · Evite los olores que le molesten.  · Coma con frecuencia porciones pequeñas de alimentos blandos con bajo contenido de grasa y alto contenido de proteína.  · Melvi de a sorbos agua, té catarino o refrescos quintin ginger ale. Chupe trozos de hielo.  Fatiga  · Duerma siestas cortas cuando pueda.  · Torrie ejercicio con regularidad.  · Acepte la ayuda de los demás.  · Adopte buenos hábitos de sueño, quintin irse a dormir y levantarse a la misma hora todos los días. Use ritter cama solamente para dormir y para las relaciones sexuales.   Cambios de humor  · Comparta chen sentimientos con otras madres u otras personas.  · Limite el consumo de azúcar, chocolate y cafeína.  · Siga alejandra dieta kendrick. No se salte comidas.  · Torrie ejercicio con regularidad.  Trice de calderon  · Salga a roni el fresco y torrie ejercicio.  · Relájese y descanse lo suficiente.  · Consulte a ritter proveedor de atención médica antes de roni medicamentos para el dolor.  Consejos para el belen trimestre  Las siguientes son algunas sugerencias para ayudarle a sobrellevar las molestias:  · Para reducir la hinchazón en los tobillos, siéntese con los pies elevados o use medias de soporte.  · Si tiene dolor en el área pélvica o en el abdomen (dolor de los ligamentos redondos) evite retorcerse con movimientos bruscos.  · Si tiene calambres en las piernas, flexione los pies por la articulación del tobillo. [place] generalmente jaime alivio inmediato. También puede probar masajeándose la pantorrilla con movimientos largos hacia abajo o estirando chen piernas antes de irse a dormir. Torrie suficiente ejercicio y use zapatos con suelas flexibles.  Consejos  para el tercer trimestre  Reducción del ardor de estómago  · En vez de comer sólo marc veces diarias, porciones grandes, coma porciones pequeñas de alimentos ligeros varias veces al día.  · Duerma con el tronco y la calderon elevados unas seis pulgadas. No se acueste hasta que hayan transcurrido dos horas desde que termina de comer.  Cómo tratar el estreñimiento  · Coma alimentos con alto contenido de fibra (alimentos de granos integrales, verdura y fruta fresca).  · Melvi agua en abundancia.  · Manisha ejercicio con regularidad.  · Hable con ritter proveedor de atención médica sobre la posibilidad de usar medicamentos (quintin docusato y psyllium)  Cuidado de chen senos  · No use jabón estrella ni alcohol, ya que pueden causar resecamiento.  · Use brasieres de maternidad. Proporcionan más soporte que los brasieres normales y pueden usarse después del embarazo si da el pecho a ritter bebé.  Duerma warren por la noche  · [place] alejandra ducha caliente antes de acostarse.  · Duerma en un colchón firme.  · Acuéstese de lado, con alejandra pierna cruzada sobre la otra.  · Use almohadas para nicki soporte a los brazos, las piernas y el vientre.  Date Last Reviewed: 8/16/2015  © 7754-3155 [employer]. [address] [place] [address], [place] [address]. Todos los derechos reservados. Esta información no pretende sustituir la atención médica profesional. Sólo ritter médico puede diagnosticar y tratar un problema de ranjan.        Adaptación al embarazo: El belen trimestre  Siga practicando los hábitos saludables que adoptó fabio ritter primer trimestre. Ya que podría sentirse un poco más cansada de lo normal, planifique ritter día con leighton. Suzanne los siguientes consejos y elija los que mejor se adaptan a ritter estilo de didier.  Si tiene dudas, consulte a ritter proveedor de atención médica.     Si usted trabaja  Si puede, hable con ritter jefe para adaptar ritter trabajo a chen necesidades. He aquí algunos consejos:  · Si pasa mucho tiempo de pie, encuentre maneras para hacer  algunas tareas sentada. Cuando esté balderrama, trate de apoyar un pie en un banquito y desplace a menudo ritter peso de alejandra pierna a la otra. Use zapatos de tacón bajo.  · Si trabaja sentada, mantenga las rodillas a la altura de las caderas. Apoye los pies en alejandra superficie firme y siéntese derecha, apoyando la dayana lumbar de la espalda.  · Si trabaja muchas horas, averigüe si es posible cambiar ritter horario. Pruebe tomando recreos más cortos con más frecuencia.  Cuando viaje  El belen trimestre puede ser el mejor momento para hacer cualquier tipo de viajes. Hable con ritter proveedor de atención médica sobre algún plan especial que usted podría necesitar. En todo momento:  · Use un cinturón de seguridad. Abróchese la parte horizontal debajo del vientre, y póngase también la parte diagonal del cinturón.  · Fabio los viajes largos en automóvil o avión, tome descansos frecuentes y pasee de un lado a otro para estirar las piernas.  · Melvi mucho líquido fabio los vuelos en avión; el aire de las cabinas es muy seco.  · Evite los climas cálidos o las grandes altitudes si no está acostumbrada a estas condiciones.  · Evite ir a lugares en que el agua o los alimentos podrían causarle alejandra enfermedad.  · Asegúrese de tener al día todas chen vacunas, incluso la de la gripe. [place] es especialmente importante si viaja al exterior.   Tómese un tiempo para relajarse  Reserve tiempo para descansar y relajarse en el trabajo o la casa.  · Piqua descansos breves todos los brianda. Manisha ejercicios de relajación.  · En períodos de tensión, respire hondo.  · Trate de no asumir demasiadas responsabilidades. Programe chen quehaceres para los momentos en que tenga más energía.  · Siempre que pueda, duerma siesta o siéntese y relájese.  · Después de la semana 16, no se acueste de espalda por más de unos minutos. Acuéstese de costado y cambie de lado a menudo.  Las relaciones sexuales  A menos que ritter proveedor de atención médica le indique lo contrario, no  hay ningún motivo que le impida tener relaciones sexuales ahora. El suministro de carmen a la dayana pélvica aumenta en el belen trimestre, por lo que el sexo podría resultarle más agradable. Pruebe con diferentes posiciones para shirlene la que mejor le funciona. Además, hable con ritter jenn si nota algún cambio en ritter apetito sexual. Es posible que después del acto sexual, note algunas manchas de carmen, elvi no deje de informar a ritter proveedor de atención médica si tiene sangrado abundante.  La seguridad de ritter ambiente  Podrá seguir limpiando ritter casa y usando productos perfumados. Lucrecia con que tome unas sencillas precauciones:  · Use guantes cuando vaya a emplear detergentes y desinfectantes líquidos.  · Angel las ventanas para que entre aire fresco. Si está pintando, use un ventilador.  · Evite el humo de segunda mano.  · No inhale los vapores del esmalte de uñas, las lacas para el pelo, los limpiadores u otras sustancias químicas.  Date Last Reviewed: 8/16/2015  © 5079-8626 [employer]. [address] [place] [address]. Todos los derechos reservados. Esta información no pretende sustituir la atención médica profesional. Sólo ritter médico puede diagnosticar y tratar un problema de ranjan.        Los cambios en el belen trimestre    Cada día que pasa, usted y ritter bebé van cambiando y creciendo juntos. He aquí un breve resumen de lo que les está ocurriendo a los dos.  Los cambios de ritter cuerpo  Aunque usted no lo note, ritter cuerpo se está adaptando para satisfacer las necesidades de crecimiento del bebé. Los cambios de ritter cuerpo podrían afectar también ritter estado de ánimo.  Ritter cuerpo  Ritter útero se expande a medida que crece el bebé. Con el pasar de las semanas, usted sentirá más presión en la vejiga, el estómago y otros órganos; también podría cambiarle el color de la piel en la frente, la nariz o las mejillas. Es posible que las pecas se vuelvan más oscuras y que los lunares crezcan. Puede que note alejandra  línea más oscura en la línea media de ritter abdomen, entre ritter ombligo y ritter hueso púbico.  Ritter estado de ánimo  El belen trimestre suele ser más llevadero que el monica; aun así, prepárese para experimentar cambios en ritter estado de ánimo. Estas fluctuaciones son alejandra parte normal del embarazo, y se deben al aumento en los niveles de las hormonas (sustancias químicas que afectan el funcionamiento de los órganos) que ritter cuerpo elabora.  El desarrollo de ritter bebé    Mes 4  Hacia la semana número 12 (3 meses), se pueden escuchar los latidos del bebé mediante un Doppler (dispositivo de mano para efectuar pruebas de ultrasonido) alrededor de la semana 9 a 10. Comienzan a formarse las sam, las pestañas y las uñas.   Mes 5  Podría sentir cómo se mueve ritter bebé. Después de alejandra etapa de crecimiento acelerado, el bebé mide enoch 10 pulgadas.   Mes 6  Se marquis formado las huellas dactilares del bebé, que ahora pesa entre 1 y 2 libras y mide unas 12 pulgadas de martin.   Date Last Reviewed: 8/16/2015  © 0456-7694 [employer]. [address] [place] [address], [place] [address]. Todos los derechos reservados. Esta información no pretende sustituir la atención médica profesional. Sólo ritter médico puede diagnosticar y tratar un problema de ranjan.

## 2020-03-18 NOTE — PROGRESS NOTES
Here for colposcopy, but routine prenatal visit also done today.  Patient feels well.  Denies any complaints.  Is very nervous about the colposcopy.  I explained normal pap with + high risk HPV.  Discussed pathophysiology of HPV and answered all questions.  Colposcopy was normal.  Needs repeat pap with HPV co-test no sooner than 6 weeks postpartum.  Advised her to start taking a daily baby aspirin.  Sono today: normal growth; heart views suboptimal; mild BL pyelectasis.  I discussed findings with her, and plan for repeat ultrasound in 4 weeks.  Signed pt up for [facility]  RTC 4 weeks with Moon TYSON for routine OB visit and f/u anatomy ultrasound

## 2020-03-18 NOTE — PROCEDURES
Colposcopy-Today  Date/Time: 3/18/2020 11:00 AM  Performed by: Evelyn Teixeira MD  Authorized by: Evelyn Teixeira MD     Consent Done?:  Yes (Written)  Assistants?: Yes    List of assistants:  Jessica Rossi LPN    Colposcopy Site:  Cervix  Acrowhite Lesion: No    Atypical Vessels: No    Transformation Zone Adequate?: Yes    Biopsy?: No    ECC Performed?: No    LEEP Performed?: No    Estimated blood loss (cc):  0   Patient tolerated the procedure well with no immediate complications.   Post-operative instructions were provided for the patient.   Patient was discharged and will follow up if any complications occur     Normal colposcopy.  Repeat pap with HPV co-test no sooner than 6 weeks postpartum

## 2020-03-27 ENCOUNTER — PATIENT MESSAGE (OUTPATIENT)
Dept: OBSTETRICS AND GYNECOLOGY | Facility: CLINIC | Age: 30
End: 2020-03-27

## 2020-04-15 ENCOUNTER — PROCEDURE VISIT (OUTPATIENT)
Dept: OBSTETRICS AND GYNECOLOGY | Facility: CLINIC | Age: 30
End: 2020-04-15
Payer: MEDICAID

## 2020-04-15 ENCOUNTER — ROUTINE PRENATAL (OUTPATIENT)
Dept: OBSTETRICS AND GYNECOLOGY | Facility: CLINIC | Age: 30
End: 2020-04-15
Payer: MEDICAID

## 2020-04-15 VITALS
WEIGHT: 266.31 LBS | BODY MASS INDEX: 47.18 KG/M2 | DIASTOLIC BLOOD PRESSURE: 74 MMHG | SYSTOLIC BLOOD PRESSURE: 116 MMHG

## 2020-04-15 DIAGNOSIS — O99.210 OBESITY IN PREGNANCY: Primary | ICD-10-CM

## 2020-04-15 DIAGNOSIS — O35.EXX0 KIDNEY ABNORMALITY OF FETUS ON PRENATAL ULTRASOUND: ICD-10-CM

## 2020-04-15 DIAGNOSIS — Z3A.24 24 WEEKS GESTATION OF PREGNANCY: ICD-10-CM

## 2020-04-15 PROBLEM — Z34.92 NORMAL PREGNANCY IN SECOND TRIMESTER: Status: RESOLVED | Noted: 2020-03-03 | Resolved: 2020-04-15

## 2020-04-15 PROCEDURE — 99999 PR PBB SHADOW E&M-EST. PATIENT-LVL II: CPT | Mod: PBBFAC,,, | Performed by: MIDWIFE

## 2020-04-15 PROCEDURE — 99212 OFFICE O/P EST SF 10 MIN: CPT | Mod: PBBFAC,TH,25 | Performed by: MIDWIFE

## 2020-04-15 PROCEDURE — 99213 OFFICE O/P EST LOW 20 MIN: CPT | Mod: TH,S$PBB,, | Performed by: MIDWIFE

## 2020-04-15 PROCEDURE — 99213 PR OFFICE/OUTPT VISIT, EST, LEVL III, 20-29 MIN: ICD-10-PCS | Mod: TH,S$PBB,, | Performed by: MIDWIFE

## 2020-04-15 PROCEDURE — 99999 PR PBB SHADOW E&M-EST. PATIENT-LVL II: ICD-10-PCS | Mod: PBBFAC,,, | Performed by: MIDWIFE

## 2020-04-15 PROCEDURE — 76816 OB US FOLLOW-UP PER FETUS: CPT | Mod: PBBFAC | Performed by: OBSTETRICS & GYNECOLOGY

## 2020-04-15 PROCEDURE — 76816 PR  US,PREGNANT UTERUS,F/U,TRANSABD APP: ICD-10-PCS | Mod: 26,S$PBB,, | Performed by: OBSTETRICS & GYNECOLOGY

## 2020-04-15 PROCEDURE — 76816 OB US FOLLOW-UP PER FETUS: CPT | Mod: 26,S$PBB,, | Performed by: OBSTETRICS & GYNECOLOGY

## 2020-04-15 NOTE — PROGRESS NOTES
29 y.o. 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  RTC x 4 wks, call or present sooner prn.

## 2020-04-24 ENCOUNTER — TELEPHONE (OUTPATIENT)
Dept: OBSTETRICS AND GYNECOLOGY | Facility: CLINIC | Age: 30
End: 2020-04-24

## 2020-05-12 ENCOUNTER — ROUTINE PRENATAL (OUTPATIENT)
Dept: OBSTETRICS AND GYNECOLOGY | Facility: CLINIC | Age: 30
End: 2020-05-12
Payer: MEDICAID

## 2020-05-12 ENCOUNTER — LAB VISIT (OUTPATIENT)
Dept: LAB | Facility: HOSPITAL | Age: 30
End: 2020-05-12
Attending: MIDWIFE
Payer: MEDICAID

## 2020-05-12 ENCOUNTER — PROCEDURE VISIT (OUTPATIENT)
Dept: OBSTETRICS AND GYNECOLOGY | Facility: CLINIC | Age: 30
End: 2020-05-12
Payer: MEDICAID

## 2020-05-12 VITALS
WEIGHT: 271.19 LBS | BODY MASS INDEX: 48.03 KG/M2 | SYSTOLIC BLOOD PRESSURE: 120 MMHG | DIASTOLIC BLOOD PRESSURE: 74 MMHG

## 2020-05-12 DIAGNOSIS — O99.210 OBESITY IN PREGNANCY: ICD-10-CM

## 2020-05-12 DIAGNOSIS — O35.EXX0 KIDNEY ABNORMALITY OF FETUS ON PRENATAL ULTRASOUND: ICD-10-CM

## 2020-05-12 DIAGNOSIS — Z3A.28 28 WEEKS GESTATION OF PREGNANCY: ICD-10-CM

## 2020-05-12 DIAGNOSIS — O28.3 ABNORMAL FETAL ULTRASOUND: Primary | ICD-10-CM

## 2020-05-12 DIAGNOSIS — O35.EXX0 PYELECTASIS OF FETUS ON PRENATAL ULTRASOUND: ICD-10-CM

## 2020-05-12 LAB
BASOPHILS # BLD AUTO: 0.03 K/UL (ref 0–0.2)
BASOPHILS NFR BLD: 0.3 % (ref 0–1.9)
DIFFERENTIAL METHOD: ABNORMAL
EOSINOPHIL # BLD AUTO: 0.1 K/UL (ref 0–0.5)
EOSINOPHIL NFR BLD: 0.5 % (ref 0–8)
ERYTHROCYTE [DISTWIDTH] IN BLOOD BY AUTOMATED COUNT: 14 % (ref 11.5–14.5)
GLUCOSE SERPL-MCNC: 179 MG/DL (ref 70–140)
HCT VFR BLD AUTO: 40.2 % (ref 37–48.5)
HGB BLD-MCNC: 12.4 G/DL (ref 12–16)
IMM GRANULOCYTES # BLD AUTO: 0.06 K/UL (ref 0–0.04)
IMM GRANULOCYTES NFR BLD AUTO: 0.5 % (ref 0–0.5)
LYMPHOCYTES # BLD AUTO: 2.2 K/UL (ref 1–4.8)
LYMPHOCYTES NFR BLD: 18.8 % (ref 18–48)
MCH RBC QN AUTO: 30.3 PG (ref 27–31)
MCHC RBC AUTO-ENTMCNC: 30.8 G/DL (ref 32–36)
MCV RBC AUTO: 98 FL (ref 82–98)
MONOCYTES # BLD AUTO: 0.6 K/UL (ref 0.3–1)
MONOCYTES NFR BLD: 4.9 % (ref 4–15)
NEUTROPHILS # BLD AUTO: 8.6 K/UL (ref 1.8–7.7)
NEUTROPHILS NFR BLD: 75 % (ref 38–73)
NRBC BLD-RTO: 0 /100 WBC
PLATELET # BLD AUTO: 242 K/UL (ref 150–350)
PMV BLD AUTO: 11.8 FL (ref 9.2–12.9)
RBC # BLD AUTO: 4.09 M/UL (ref 4–5.4)
WBC # BLD AUTO: 11.44 K/UL (ref 3.9–12.7)

## 2020-05-12 PROCEDURE — 99213 OFFICE O/P EST LOW 20 MIN: CPT | Mod: TH,S$PBB,, | Performed by: MIDWIFE

## 2020-05-12 PROCEDURE — 99999 PR PBB SHADOW E&M-EST. PATIENT-LVL II: ICD-10-PCS | Mod: PBBFAC,,, | Performed by: MIDWIFE

## 2020-05-12 PROCEDURE — 76816 OB US FOLLOW-UP PER FETUS: CPT | Mod: 26,S$PBB,, | Performed by: OBSTETRICS & GYNECOLOGY

## 2020-05-12 PROCEDURE — 85025 COMPLETE CBC W/AUTO DIFF WBC: CPT

## 2020-05-12 PROCEDURE — 99212 OFFICE O/P EST SF 10 MIN: CPT | Mod: PBBFAC,TH,25 | Performed by: MIDWIFE

## 2020-05-12 PROCEDURE — 86592 SYPHILIS TEST NON-TREP QUAL: CPT

## 2020-05-12 PROCEDURE — 76819 FETAL BIOPHYS PROFIL W/O NST: CPT | Mod: 26,S$PBB,, | Performed by: OBSTETRICS & GYNECOLOGY

## 2020-05-12 PROCEDURE — 82950 GLUCOSE TEST: CPT

## 2020-05-12 PROCEDURE — 76819 PR US, OB, FETAL BIOPHYSICAL, W/O NST: ICD-10-PCS | Mod: 26,S$PBB,, | Performed by: OBSTETRICS & GYNECOLOGY

## 2020-05-12 PROCEDURE — 86703 HIV-1/HIV-2 1 RESULT ANTBDY: CPT

## 2020-05-12 PROCEDURE — 76816 OB US FOLLOW-UP PER FETUS: CPT | Mod: PBBFAC | Performed by: OBSTETRICS & GYNECOLOGY

## 2020-05-12 PROCEDURE — 99999 PR PBB SHADOW E&M-EST. PATIENT-LVL II: CPT | Mod: PBBFAC,,, | Performed by: MIDWIFE

## 2020-05-12 PROCEDURE — 76819 FETAL BIOPHYS PROFIL W/O NST: CPT | Mod: PBBFAC | Performed by: OBSTETRICS & GYNECOLOGY

## 2020-05-12 PROCEDURE — 99213 PR OFFICE/OUTPT VISIT, EST, LEVL III, 20-29 MIN: ICD-10-PCS | Mod: TH,S$PBB,, | Performed by: MIDWIFE

## 2020-05-12 PROCEDURE — 76816 PR  US,PREGNANT UTERUS,F/U,TRANSABD APP: ICD-10-PCS | Mod: 26,S$PBB,, | Performed by: OBSTETRICS & GYNECOLOGY

## 2020-05-12 PROCEDURE — 36415 COLL VENOUS BLD VENIPUNCTURE: CPT

## 2020-05-12 NOTE — PROGRESS NOTES
29 y.o. female  at 28w5d   Reports + FM, denies VB, LOF or CTX  Doing well without concerns   TW lbs   28wk labs today (O POS)  Follow up US today to eval kidneys, dilation of bilateral ureters, will have pt see MFM, appropriate growth and fluid normal, will have staff call pt and schedule MFM appointment  Tdap info given, will decide by next visit  Reviewed warning signs, normal FKCs,  labor precautions and how/when to call.  RTC x 2 wks, call or present sooner prn.

## 2020-05-13 ENCOUNTER — PATIENT MESSAGE (OUTPATIENT)
Dept: OBSTETRICS AND GYNECOLOGY | Facility: HOSPITAL | Age: 30
End: 2020-05-13

## 2020-05-13 DIAGNOSIS — O99.810 ABNORMAL O'SULLIVAN GLUCOSE CHALLENGE TEST, ANTEPARTUM: Primary | ICD-10-CM

## 2020-05-13 LAB
HIV 1+2 AB+HIV1 P24 AG SERPL QL IA: NEGATIVE
RPR SER QL: NORMAL

## 2020-05-14 ENCOUNTER — LAB VISIT (OUTPATIENT)
Dept: LAB | Facility: HOSPITAL | Age: 30
End: 2020-05-14
Attending: MIDWIFE
Payer: MEDICAID

## 2020-05-14 DIAGNOSIS — O99.810 ABNORMAL O'SULLIVAN GLUCOSE CHALLENGE TEST, ANTEPARTUM: ICD-10-CM

## 2020-05-14 LAB
GLUCOSE SERPL-MCNC: 117 MG/DL
GLUCOSE SERPL-MCNC: 146 MG/DL
GLUCOSE SERPL-MCNC: 203 MG/DL
GLUCOSE SERPL-MCNC: 88 MG/DL (ref 70–110)

## 2020-05-14 PROCEDURE — 36415 COLL VENOUS BLD VENIPUNCTURE: CPT

## 2020-05-14 PROCEDURE — 82951 GLUCOSE TOLERANCE TEST (GTT): CPT

## 2020-05-14 PROCEDURE — 82952 GTT-ADDED SAMPLES: CPT

## 2020-05-18 ENCOUNTER — TELEPHONE (OUTPATIENT)
Dept: OBSTETRICS AND GYNECOLOGY | Facility: CLINIC | Age: 30
End: 2020-05-18

## 2020-05-18 ENCOUNTER — OFFICE VISIT (OUTPATIENT)
Dept: OBSTETRICS AND GYNECOLOGY | Facility: CLINIC | Age: 30
End: 2020-05-18
Payer: MEDICAID

## 2020-05-18 DIAGNOSIS — O28.3 ABNORMAL FETAL ULTRASOUND: ICD-10-CM

## 2020-05-18 DIAGNOSIS — O35.EXX0 FETAL RENAL ANOMALY, SINGLE GESTATION: ICD-10-CM

## 2020-05-18 PROCEDURE — 76811 OB US DETAILED SNGL FETUS: CPT | Mod: PBBFAC | Performed by: OBSTETRICS & GYNECOLOGY

## 2020-05-18 PROCEDURE — 76811 OB US DETAILED SNGL FETUS: CPT | Mod: 26,S$PBB,, | Performed by: OBSTETRICS & GYNECOLOGY

## 2020-05-18 PROCEDURE — 99213 PR OFFICE/OUTPT VISIT, EST, LEVL III, 20-29 MIN: ICD-10-PCS | Mod: S$PBB,TH,25, | Performed by: OBSTETRICS & GYNECOLOGY

## 2020-05-18 PROCEDURE — 76811 PR US, OB FETAL EVAL & EXAM, TRANSABDOM,FIRST GESTATION: ICD-10-PCS | Mod: 26,S$PBB,, | Performed by: OBSTETRICS & GYNECOLOGY

## 2020-05-18 PROCEDURE — 99213 OFFICE O/P EST LOW 20 MIN: CPT | Mod: S$PBB,TH,25, | Performed by: OBSTETRICS & GYNECOLOGY

## 2020-05-18 NOTE — TELEPHONE ENCOUNTER
----- Message from Yue Roberts sent at 5/18/2020  9:06 AM CDT -----  Contact: pt  Pt is requesting her  to come to appt today. They both have questions. [phone]

## 2020-05-18 NOTE — TELEPHONE ENCOUNTER
Returned call to patient, she confirmed message.  Made her aware that our current policy is that no one can accompany her to the visit, she verbalized understanding.

## 2020-05-18 NOTE — PROGRESS NOTES
Indication  ========    Targeted MFM anatomy    Method  ======    Transabdominal ultrasound examination. View: Sufficient    Pregnancy  =========    Gonzalez pregnancy. Number of fetuses: 1    Dating  ======    GA by prior assessment 29 w + 4 d  ARANZA by prior assessment: 7/30/2020  Ultrasound examination on: 5/18/2020  GA by U/S based upon: AC, BPD, Femur, HC  GA by U/S 30 w + 2 d  ARANZA by U/S: 7/25/2020  Assigned: based on stated ARANZA, selected on 03/18/2020  Assigned GA 29 w + 4 d  Assigned ARANZA: 7/30/2020  Pregnancy length 280 d    General Evaluation  ==============    Cardiac activity present.  bpm.  Fetal movements visualized.  Presentation cephalic.  Placenta anterior.  Amniotic fluid MVP 5.0 cm. JHONATHAN 12.8 cm. Q1 3.8 cm, Q2 5.0 cm, Q3 1.5 cm, Q4 2.5 cm.    Fetal Biometry  ============    Standard  BPD 77.5 mm  31w 1d                Hadlock    OFD 94.5 mm  30w 4d                Myrna    .5 mm  30w 2d                Hadlock    .6 mm  30w 0d                Hadlock    Femur 56.1 mm  29w 4d                Hadlock    Humerus 55.0 mm  32w 0d                Myrna    HC / AC 1.07    EFW 1,486 g          45%        Jesus    EFW (lb) 3 lb  EFW (oz) 4 oz  EFW by: Hadlock (BPD-HC-AC-FL)  Head / Face / Neck  Cephalic index 0.82    Extremities / Bony Struc  FL / BPD 0.72    FL / HC 0.20    FL / AC 0.22    Other Structures   bpm    Fetal Anatomy  ============    Cranium: normal  Lateral ventricles: normal  Choroid plexus: normal  Midline falx: normal  Cavum septi pellucidi: normal  Cerebellum: normal  Cisterna magna: normal  Lips: normal  Profile: suboptimal  Nose: normal  4-chamber view: normal  RVOT view: normal  LVOT view: normal  Heart / Thorax  Situs: situs solitus (normal)  Aortic arch view: suboptimal  SVC: suboptimal  IVC: suboptimal  3-vessel view: suboptimal  Diaphragm: normal  Cord insertion: normal  Stomach: normal  Bladder: normal  Genitals: normal  Abdomen  Rt kidney: pyelectasis  measuring .76  Lt kidney: pyelectasis measuring .84  Cervical spine: suboptimal  Thoracic spine: suboptimal  Lumbar spine: suboptimal  Sacral spine: suboptimal  Arms: both visualized  Legs: both visualized  Gender: male    Consultation  ==========    30 yo G 2  at 29 wk 4 d seen in consultation due to renal pelviectasis  Prenatal record reviewed  Denies problems in pregnancy thus far  No VB, LOF, CTX  Abnl 1 hour, but 3 hour normal (although 1 value abnormal)  At time of anatomy scan mild bilateral renal pelviectasis noted.    PMHX: neg  PSHX: neg  Social: neg x 3  OBHX: 1 prior sAb  FMHX: neg    A targeted ultrasound evaluation was performed. Findings are as detailed on the report. Some views were suboptimal due to the gestational age  and the maternal habitus. Mild bilateral renal pelviectasis was noted with the renal pelvis measuring 7.6 - 8.4 mm bilaterally. There is no  evidence of ureteral dilation. The bladder appears normal in size.    The finding of bilateral mild renal pelvis dilation was discussed with the patient. We reviewed basic anatomy of the renal collecting system and  then discussed possible etiologies for renal pelvis dilation. When dilation of the renal pelvis is borderline or mild, most cases will resolve  spontaneously. However, if the renal dilation persists in the  period, the most common conditions that can cause renal pelvis dilation  are UPJ obstruction (ureteropelvic junction), UVJ obstruction (ureterovesical junction) and VUR (vesicoureteral reflux). These conditions  predispose infants/children to urinary tract infection, but can be effectively followed and treated. Early and prompt treatment can decrease the  incidence of upper urinary tract infection and renal dysfunction. The vast majority of infants with these conditions never require surgical  intervention. However,  imaging studies are recommended to assess for the presence of UPJ obstruction or VUR. These  studies are  generally performed between weeks 2 - 4 of life. The pediatrician should be made aware of this prenatal diagnosis so that appropriate follow-up  and treatment can be coordinated including discharge of the infant on prophylactic antibiotics.    Given the borderline dilation at this time, we will plan to repeat the ultrasound in 6 weeks to determine whether  imaging is needed.    I overall spent approximately 10-15 minutes in face to face time with the patient and her family, greater than 50% of which was in counseling  and care coordination.    Impression  =========    A detailed fetal anatomic ultrasound examination was performed for the following high risk indication: renal pelvis dilation.  Mild bilateral renal pelvis dilation is noted.  No fetal structural malformations are identified; however, fetal imaging is suboptimal today.  Fetal size today is consistent with established gestational age. Placental location is normal without evidence of previa.    Recommendation  ==============    We recommend repeat evaluation for fetal growth and kidney assessment in 6 weeks.    Thank you again for allowing us to participate in the care of your patients. If you have any questions concerning today's consultation, feel free  to contact me or one of my partners. We can be reached at [phone] during normal business hours. If you have a question after normal  business hours, please contact Labor and Delivery at [phone].      Telemedicine [facility] Ultrasound Note  Consultation started: 2020 at 11:50 PM   The chief complaint leading to consultation is: renal dilation  The patient location is: [facility]  The patient arrived at: 1100  Spoke nurse at bedside with patient assisting consultant. Consultation ended: 2020 at 1205.    Total time spent with patient: < 30 Minutes    Consulting clinician was informed of the encounter and consult note.      Each patient to whom he or she  provides medical services by telemedicine is:  (1) informed of the relationship between the physician and patient and the respective role of any other health care provider with respect to management of the patient; and (2) notified that he or she may decline to receive medical services by telemedicine and may withdraw from such care at any time.

## 2020-05-21 ENCOUNTER — TELEPHONE (OUTPATIENT)
Dept: OBSTETRICS AND GYNECOLOGY | Facility: CLINIC | Age: 30
End: 2020-05-21

## 2020-05-21 DIAGNOSIS — O99.210 OBESITY IN PREGNANCY: Primary | ICD-10-CM

## 2020-05-21 NOTE — TELEPHONE ENCOUNTER
Patient called to see if she needs ultrasound with visit on 5/28.  There are no order for an ultrasound.  Will send to ANDRES Katz to see if she needs to schedule one.

## 2020-05-28 ENCOUNTER — PATIENT MESSAGE (OUTPATIENT)
Dept: OBSTETRICS AND GYNECOLOGY | Facility: CLINIC | Age: 30
End: 2020-05-28

## 2020-05-28 ENCOUNTER — ROUTINE PRENATAL (OUTPATIENT)
Dept: OBSTETRICS AND GYNECOLOGY | Facility: CLINIC | Age: 30
End: 2020-05-28
Payer: MEDICAID

## 2020-05-28 VITALS — BODY MASS INDEX: 48 KG/M2 | WEIGHT: 270.94 LBS | SYSTOLIC BLOOD PRESSURE: 124 MMHG | DIASTOLIC BLOOD PRESSURE: 70 MMHG

## 2020-05-28 DIAGNOSIS — O28.3 ABNORMAL FETAL ULTRASOUND: ICD-10-CM

## 2020-05-28 DIAGNOSIS — Z3A.31 31 WEEKS GESTATION OF PREGNANCY: ICD-10-CM

## 2020-05-28 DIAGNOSIS — O99.810 ABNORMAL O'SULLIVAN GLUCOSE CHALLENGE TEST, ANTEPARTUM: ICD-10-CM

## 2020-05-28 DIAGNOSIS — O99.210 OBESITY IN PREGNANCY: ICD-10-CM

## 2020-05-28 DIAGNOSIS — O35.EXX0 PYELECTASIS OF FETUS ON PRENATAL ULTRASOUND: Primary | ICD-10-CM

## 2020-05-28 PROCEDURE — 99999 PR PBB SHADOW E&M-EST. PATIENT-LVL II: ICD-10-PCS | Mod: PBBFAC,,, | Performed by: MIDWIFE

## 2020-05-28 PROCEDURE — 99213 OFFICE O/P EST LOW 20 MIN: CPT | Mod: TH,S$PBB,, | Performed by: MIDWIFE

## 2020-05-28 PROCEDURE — 99213 PR OFFICE/OUTPT VISIT, EST, LEVL III, 20-29 MIN: ICD-10-PCS | Mod: TH,S$PBB,, | Performed by: MIDWIFE

## 2020-05-28 PROCEDURE — 99212 OFFICE O/P EST SF 10 MIN: CPT | Mod: PBBFAC,TH | Performed by: MIDWIFE

## 2020-05-28 PROCEDURE — 99999 PR PBB SHADOW E&M-EST. PATIENT-LVL II: CPT | Mod: PBBFAC,,, | Performed by: MIDWIFE

## 2020-05-28 NOTE — PROGRESS NOTES
29 y.o. female  at 31w0d   Reports + FM, denies VB, LOF or CTX  Doing well without concerns   TW lbs   Reviewed 28wk lab results (O POS)  Tdap info given  US at [place]   Reviewed warning signs, normal FKCs,  labor precautions and how/when to call.  RTC x 2 wks, call or present sooner prn.

## 2020-05-29 ENCOUNTER — PATIENT MESSAGE (OUTPATIENT)
Dept: OBSTETRICS AND GYNECOLOGY | Facility: CLINIC | Age: 30
End: 2020-05-29

## 2020-06-08 ENCOUNTER — ROUTINE PRENATAL (OUTPATIENT)
Dept: OBSTETRICS AND GYNECOLOGY | Facility: CLINIC | Age: 30
End: 2020-06-08
Payer: MEDICAID

## 2020-06-08 ENCOUNTER — PROCEDURE VISIT (OUTPATIENT)
Dept: OBSTETRICS AND GYNECOLOGY | Facility: CLINIC | Age: 30
End: 2020-06-08
Payer: MEDICAID

## 2020-06-08 VITALS
DIASTOLIC BLOOD PRESSURE: 64 MMHG | BODY MASS INDEX: 48.35 KG/M2 | WEIGHT: 272.94 LBS | SYSTOLIC BLOOD PRESSURE: 106 MMHG

## 2020-06-08 DIAGNOSIS — Z3A.32 32 WEEKS GESTATION OF PREGNANCY: ICD-10-CM

## 2020-06-08 DIAGNOSIS — O99.210 OBESITY IN PREGNANCY: Primary | ICD-10-CM

## 2020-06-08 DIAGNOSIS — O99.210 OBESITY IN PREGNANCY: ICD-10-CM

## 2020-06-08 DIAGNOSIS — O35.EXX0 PYELECTASIS OF FETUS ON PRENATAL ULTRASOUND: ICD-10-CM

## 2020-06-08 DIAGNOSIS — O99.810 ABNORMAL O'SULLIVAN GLUCOSE CHALLENGE TEST, ANTEPARTUM: ICD-10-CM

## 2020-06-08 DIAGNOSIS — Z23 NEED FOR TDAP VACCINATION: ICD-10-CM

## 2020-06-08 PROBLEM — O28.3 ABNORMAL FETAL ULTRASOUND: Status: RESOLVED | Noted: 2020-05-12 | Resolved: 2020-06-08

## 2020-06-08 PROCEDURE — 99213 PR OFFICE/OUTPT VISIT, EST, LEVL III, 20-29 MIN: ICD-10-PCS | Mod: TH,S$PBB,, | Performed by: MIDWIFE

## 2020-06-08 PROCEDURE — 99999 PR PBB SHADOW E&M-EST. PATIENT-LVL II: ICD-10-PCS | Mod: PBBFAC,,, | Performed by: MIDWIFE

## 2020-06-08 PROCEDURE — 76816 OB US FOLLOW-UP PER FETUS: CPT | Mod: 26,S$PBB,, | Performed by: OBSTETRICS & GYNECOLOGY

## 2020-06-08 PROCEDURE — 76819 PR US, OB, FETAL BIOPHYSICAL, W/O NST: ICD-10-PCS | Mod: 26,S$PBB,, | Performed by: OBSTETRICS & GYNECOLOGY

## 2020-06-08 PROCEDURE — 99213 OFFICE O/P EST LOW 20 MIN: CPT | Mod: TH,S$PBB,, | Performed by: MIDWIFE

## 2020-06-08 PROCEDURE — 90471 IMMUNIZATION ADMIN: CPT | Mod: PBBFAC

## 2020-06-08 PROCEDURE — 76816 OB US FOLLOW-UP PER FETUS: CPT | Mod: PBBFAC | Performed by: OBSTETRICS & GYNECOLOGY

## 2020-06-08 PROCEDURE — 76816 PR  US,PREGNANT UTERUS,F/U,TRANSABD APP: ICD-10-PCS | Mod: 26,S$PBB,, | Performed by: OBSTETRICS & GYNECOLOGY

## 2020-06-08 PROCEDURE — 99999 PR PBB SHADOW E&M-EST. PATIENT-LVL II: CPT | Mod: PBBFAC,,, | Performed by: MIDWIFE

## 2020-06-08 PROCEDURE — 99212 OFFICE O/P EST SF 10 MIN: CPT | Mod: PBBFAC,TH,25 | Performed by: MIDWIFE

## 2020-06-08 PROCEDURE — 76819 FETAL BIOPHYS PROFIL W/O NST: CPT | Mod: 26,S$PBB,, | Performed by: OBSTETRICS & GYNECOLOGY

## 2020-06-08 PROCEDURE — 76819 FETAL BIOPHYS PROFIL W/O NST: CPT | Mod: PBBFAC | Performed by: OBSTETRICS & GYNECOLOGY

## 2020-06-08 NOTE — PROGRESS NOTES
29 y.o. female  at 32w4d   Reports + FM, denies VB, LOF or CTX  Doing well without concerns   TWG: 15 lbs   Reviewed 28wk lab results (O POS)  Tdap today  BPP 8/8, MVP 5.1, EFW 4# 15 oz, 43%  Reviewed warning signs, normal FKCs,  labor precautions and how/when to call.  RTC x 1 wks w/ BPP, call or present sooner prn.

## 2020-06-16 ENCOUNTER — PROCEDURE VISIT (OUTPATIENT)
Dept: OBSTETRICS AND GYNECOLOGY | Facility: CLINIC | Age: 30
End: 2020-06-16
Payer: MEDICAID

## 2020-06-16 ENCOUNTER — ROUTINE PRENATAL (OUTPATIENT)
Dept: OBSTETRICS AND GYNECOLOGY | Facility: CLINIC | Age: 30
End: 2020-06-16
Payer: MEDICAID

## 2020-06-16 VITALS
DIASTOLIC BLOOD PRESSURE: 68 MMHG | BODY MASS INDEX: 48.31 KG/M2 | SYSTOLIC BLOOD PRESSURE: 110 MMHG | WEIGHT: 272.69 LBS

## 2020-06-16 DIAGNOSIS — O99.810 ABNORMAL O'SULLIVAN GLUCOSE CHALLENGE TEST, ANTEPARTUM: ICD-10-CM

## 2020-06-16 DIAGNOSIS — O99.210 OBESITY IN PREGNANCY: Primary | ICD-10-CM

## 2020-06-16 DIAGNOSIS — O99.210 OBESITY IN PREGNANCY: ICD-10-CM

## 2020-06-16 PROCEDURE — 99212 OFFICE O/P EST SF 10 MIN: CPT | Mod: PBBFAC,25,TH | Performed by: ADVANCED PRACTICE MIDWIFE

## 2020-06-16 PROCEDURE — 76819 PR US, OB, FETAL BIOPHYSICAL, W/O NST: ICD-10-PCS | Mod: 26,S$PBB,, | Performed by: OBSTETRICS & GYNECOLOGY

## 2020-06-16 PROCEDURE — 99213 OFFICE O/P EST LOW 20 MIN: CPT | Mod: TH,S$PBB,, | Performed by: ADVANCED PRACTICE MIDWIFE

## 2020-06-16 PROCEDURE — 99213 PR OFFICE/OUTPT VISIT, EST, LEVL III, 20-29 MIN: ICD-10-PCS | Mod: TH,S$PBB,, | Performed by: ADVANCED PRACTICE MIDWIFE

## 2020-06-16 PROCEDURE — 99999 PR PBB SHADOW E&M-EST. PATIENT-LVL II: CPT | Mod: PBBFAC,,, | Performed by: ADVANCED PRACTICE MIDWIFE

## 2020-06-16 PROCEDURE — 76819 FETAL BIOPHYS PROFIL W/O NST: CPT | Mod: 26,S$PBB,, | Performed by: OBSTETRICS & GYNECOLOGY

## 2020-06-16 PROCEDURE — 76819 FETAL BIOPHYS PROFIL W/O NST: CPT | Mod: PBBFAC | Performed by: OBSTETRICS & GYNECOLOGY

## 2020-06-16 PROCEDURE — 99999 PR PBB SHADOW E&M-EST. PATIENT-LVL II: ICD-10-PCS | Mod: PBBFAC,,, | Performed by: ADVANCED PRACTICE MIDWIFE

## 2020-06-19 DIAGNOSIS — O99.210 OBESITY IN PREGNANCY: ICD-10-CM

## 2020-06-21 NOTE — PROGRESS NOTES
29 y.o. female  at 33w5d   Reports + FM, denies VB, LOF or CTX  Doing well without concerns  BPP 8/8 JHONATHAN MVP 4.7 cm VTX, pyelectasis resolved on previous scan, normal JHONATHAN today, reviewed upcoming MFM appt with Dr. LUKE Teixeira-recommends keep appt since f/u recommended at last MFM appt  TW lbs   Reviewed warning signs, normal FKCs,  labor precautions and how/when to call.  RTC x 1 wks, call or present sooner prn. al

## 2020-06-23 ENCOUNTER — ROUTINE PRENATAL (OUTPATIENT)
Dept: OBSTETRICS AND GYNECOLOGY | Facility: CLINIC | Age: 30
End: 2020-06-23
Payer: MEDICAID

## 2020-06-23 ENCOUNTER — PROCEDURE VISIT (OUTPATIENT)
Dept: OBSTETRICS AND GYNECOLOGY | Facility: CLINIC | Age: 30
End: 2020-06-23
Payer: MEDICAID

## 2020-06-23 VITALS — DIASTOLIC BLOOD PRESSURE: 74 MMHG | BODY MASS INDEX: 48.7 KG/M2 | SYSTOLIC BLOOD PRESSURE: 120 MMHG | WEIGHT: 274.94 LBS

## 2020-06-23 DIAGNOSIS — O99.210 OBESITY IN PREGNANCY: ICD-10-CM

## 2020-06-23 DIAGNOSIS — O99.210 OBESITY IN PREGNANCY: Primary | ICD-10-CM

## 2020-06-23 DIAGNOSIS — Z3A.34 34 WEEKS GESTATION OF PREGNANCY: ICD-10-CM

## 2020-06-23 PROCEDURE — 76819 FETAL BIOPHYS PROFIL W/O NST: CPT | Mod: 26,S$PBB,, | Performed by: OBSTETRICS & GYNECOLOGY

## 2020-06-23 PROCEDURE — 76819 PR US, OB, FETAL BIOPHYSICAL, W/O NST: ICD-10-PCS | Mod: 26,S$PBB,, | Performed by: OBSTETRICS & GYNECOLOGY

## 2020-06-23 PROCEDURE — 99999 PR PBB SHADOW E&M-EST. PATIENT-LVL II: CPT | Mod: PBBFAC,,, | Performed by: MIDWIFE

## 2020-06-23 PROCEDURE — 99212 OFFICE O/P EST SF 10 MIN: CPT | Mod: PBBFAC,TH | Performed by: MIDWIFE

## 2020-06-23 PROCEDURE — 99213 PR OFFICE/OUTPT VISIT, EST, LEVL III, 20-29 MIN: ICD-10-PCS | Mod: TH,S$PBB,, | Performed by: MIDWIFE

## 2020-06-23 PROCEDURE — 99213 OFFICE O/P EST LOW 20 MIN: CPT | Mod: TH,S$PBB,, | Performed by: MIDWIFE

## 2020-06-23 PROCEDURE — 99999 PR PBB SHADOW E&M-EST. PATIENT-LVL II: ICD-10-PCS | Mod: PBBFAC,,, | Performed by: MIDWIFE

## 2020-06-23 PROCEDURE — 76819 FETAL BIOPHYS PROFIL W/O NST: CPT | Mod: PBBFAC | Performed by: OBSTETRICS & GYNECOLOGY

## 2020-06-23 RX ORDER — ASPIRIN 81 MG/1
81 TABLET ORAL DAILY
Qty: 30 TABLET | Refills: 11 | Status: ON HOLD
Start: 2020-06-23 | End: 2020-07-20 | Stop reason: HOSPADM

## 2020-06-23 NOTE — PROGRESS NOTES
29 y.o. female  at 34w5d   Reports + FM, denies VB, LOF or CTX  Doing well without concerns   TW lbs   Tdap 20  US BPP , MVP 4, JHONATHAN 10  Reviewed warning signs, normal FKCs,  labor precautions and how/when to call.  RTC x 2 wks, call or present sooner prn.

## 2020-06-30 ENCOUNTER — PROCEDURE VISIT (OUTPATIENT)
Dept: OBSTETRICS AND GYNECOLOGY | Facility: CLINIC | Age: 30
End: 2020-06-30
Payer: MEDICAID

## 2020-06-30 ENCOUNTER — ROUTINE PRENATAL (OUTPATIENT)
Dept: OBSTETRICS AND GYNECOLOGY | Facility: CLINIC | Age: 30
End: 2020-06-30
Payer: MEDICAID

## 2020-06-30 VITALS
WEIGHT: 275.56 LBS | DIASTOLIC BLOOD PRESSURE: 72 MMHG | SYSTOLIC BLOOD PRESSURE: 126 MMHG | BODY MASS INDEX: 48.82 KG/M2

## 2020-06-30 DIAGNOSIS — O99.210 OBESITY IN PREGNANCY: ICD-10-CM

## 2020-06-30 DIAGNOSIS — O99.810 ABNORMAL O'SULLIVAN GLUCOSE CHALLENGE TEST, ANTEPARTUM: ICD-10-CM

## 2020-06-30 DIAGNOSIS — Z3A.35 35 WEEKS GESTATION OF PREGNANCY: ICD-10-CM

## 2020-06-30 DIAGNOSIS — O99.210 OBESITY IN PREGNANCY: Primary | ICD-10-CM

## 2020-06-30 PROCEDURE — 76819 FETAL BIOPHYS PROFIL W/O NST: CPT | Mod: 26,S$PBB,, | Performed by: OBSTETRICS & GYNECOLOGY

## 2020-06-30 PROCEDURE — 99213 OFFICE O/P EST LOW 20 MIN: CPT | Mod: TH,S$PBB,, | Performed by: MIDWIFE

## 2020-06-30 PROCEDURE — 76819 FETAL BIOPHYS PROFIL W/O NST: CPT | Mod: PBBFAC | Performed by: OBSTETRICS & GYNECOLOGY

## 2020-06-30 PROCEDURE — 99999 PR PBB SHADOW E&M-EST. PATIENT-LVL II: CPT | Mod: PBBFAC,,, | Performed by: MIDWIFE

## 2020-06-30 PROCEDURE — 99212 OFFICE O/P EST SF 10 MIN: CPT | Mod: PBBFAC,TH | Performed by: MIDWIFE

## 2020-06-30 PROCEDURE — 76816 OB US FOLLOW-UP PER FETUS: CPT | Mod: PBBFAC | Performed by: OBSTETRICS & GYNECOLOGY

## 2020-06-30 PROCEDURE — 99999 PR PBB SHADOW E&M-EST. PATIENT-LVL II: ICD-10-PCS | Mod: PBBFAC,,, | Performed by: MIDWIFE

## 2020-06-30 PROCEDURE — 99213 PR OFFICE/OUTPT VISIT, EST, LEVL III, 20-29 MIN: ICD-10-PCS | Mod: TH,S$PBB,, | Performed by: MIDWIFE

## 2020-06-30 PROCEDURE — 76816 OB US FOLLOW-UP PER FETUS: CPT | Mod: 26,S$PBB,, | Performed by: OBSTETRICS & GYNECOLOGY

## 2020-06-30 PROCEDURE — 76816 PR  US,PREGNANT UTERUS,F/U,TRANSABD APP: ICD-10-PCS | Mod: 26,S$PBB,, | Performed by: OBSTETRICS & GYNECOLOGY

## 2020-06-30 PROCEDURE — 76819 PR US, OB, FETAL BIOPHYSICAL, W/O NST: ICD-10-PCS | Mod: 26,S$PBB,, | Performed by: OBSTETRICS & GYNECOLOGY

## 2020-06-30 NOTE — PROGRESS NOTES
29 y.o. female  at 35w5d  Reports + FM, denies VB, LOF or regular CTX  Doing well without concerns   TW lbs   GBS at [place]   BPP 8/8, MVP 4.2, EFW 43%, 6# 4 oz   Pt has MFM appt next week, GBS at visit following  Reviewed warning signs, normal FKCs, labor precautions and how/when to call.  RTC x 1 wks, call or present sooner prn.

## 2020-07-06 ENCOUNTER — PROCEDURE VISIT (OUTPATIENT)
Dept: OBSTETRICS AND GYNECOLOGY | Facility: CLINIC | Age: 30
End: 2020-07-06
Payer: MEDICAID

## 2020-07-06 DIAGNOSIS — O99.210 OBESITY IN PREGNANCY: ICD-10-CM

## 2020-07-06 PROCEDURE — 76816 PR  US,PREGNANT UTERUS,F/U,TRANSABD APP: ICD-10-PCS | Mod: 26,S$PBB,, | Performed by: OBSTETRICS & GYNECOLOGY

## 2020-07-06 PROCEDURE — 76819 FETAL BIOPHYS PROFIL W/O NST: CPT | Mod: GT,PBBFAC | Performed by: OBSTETRICS & GYNECOLOGY

## 2020-07-06 PROCEDURE — 99213 PR OFFICE/OUTPT VISIT, EST, LEVL III, 20-29 MIN: ICD-10-PCS | Mod: 25,95,S$PBB,TH | Performed by: OBSTETRICS & GYNECOLOGY

## 2020-07-06 PROCEDURE — 76816 OB US FOLLOW-UP PER FETUS: CPT | Mod: GT,PBBFAC | Performed by: OBSTETRICS & GYNECOLOGY

## 2020-07-06 PROCEDURE — 76819 PR US, OB, FETAL BIOPHYSICAL, W/O NST: ICD-10-PCS | Mod: 26,S$PBB,, | Performed by: OBSTETRICS & GYNECOLOGY

## 2020-07-06 PROCEDURE — 76816 OB US FOLLOW-UP PER FETUS: CPT | Mod: 26,S$PBB,, | Performed by: OBSTETRICS & GYNECOLOGY

## 2020-07-06 PROCEDURE — 99213 OFFICE O/P EST LOW 20 MIN: CPT | Mod: 25,95,S$PBB,TH | Performed by: OBSTETRICS & GYNECOLOGY

## 2020-07-06 PROCEDURE — 76819 FETAL BIOPHYS PROFIL W/O NST: CPT | Mod: 26,S$PBB,, | Performed by: OBSTETRICS & GYNECOLOGY

## 2020-07-06 NOTE — PROGRESS NOTES
Obstetrical ultrasound completed today.  See report in imaging section of [facility].

## 2020-07-09 ENCOUNTER — ROUTINE PRENATAL (OUTPATIENT)
Dept: OBSTETRICS AND GYNECOLOGY | Facility: CLINIC | Age: 30
End: 2020-07-09
Payer: MEDICAID

## 2020-07-09 ENCOUNTER — PROCEDURE VISIT (OUTPATIENT)
Dept: OBSTETRICS AND GYNECOLOGY | Facility: CLINIC | Age: 30
End: 2020-07-09
Payer: MEDICAID

## 2020-07-09 VITALS
SYSTOLIC BLOOD PRESSURE: 118 MMHG | BODY MASS INDEX: 49.17 KG/M2 | WEIGHT: 277.56 LBS | DIASTOLIC BLOOD PRESSURE: 68 MMHG

## 2020-07-09 DIAGNOSIS — O99.810 ABNORMAL O'SULLIVAN GLUCOSE CHALLENGE TEST, ANTEPARTUM: ICD-10-CM

## 2020-07-09 DIAGNOSIS — O35.EXX0 PYELECTASIS OF FETUS ON PRENATAL ULTRASOUND: ICD-10-CM

## 2020-07-09 DIAGNOSIS — Z3A.37 37 WEEKS GESTATION OF PREGNANCY: ICD-10-CM

## 2020-07-09 DIAGNOSIS — O99.210 OBESITY IN PREGNANCY: ICD-10-CM

## 2020-07-09 DIAGNOSIS — O99.210 OBESITY IN PREGNANCY: Primary | ICD-10-CM

## 2020-07-09 PROCEDURE — 99213 OFFICE O/P EST LOW 20 MIN: CPT | Mod: TH,S$PBB,, | Performed by: MIDWIFE

## 2020-07-09 PROCEDURE — 76819 FETAL BIOPHYS PROFIL W/O NST: CPT | Mod: PBBFAC | Performed by: OBSTETRICS & GYNECOLOGY

## 2020-07-09 PROCEDURE — 99999 PR PBB SHADOW E&M-EST. PATIENT-LVL II: CPT | Mod: PBBFAC,,, | Performed by: MIDWIFE

## 2020-07-09 PROCEDURE — 99212 OFFICE O/P EST SF 10 MIN: CPT | Mod: PBBFAC,TH | Performed by: MIDWIFE

## 2020-07-09 PROCEDURE — 76819 FETAL BIOPHYS PROFIL W/O NST: CPT | Mod: 26,S$PBB,, | Performed by: OBSTETRICS & GYNECOLOGY

## 2020-07-09 PROCEDURE — 76819 PR US, OB, FETAL BIOPHYSICAL, W/O NST: ICD-10-PCS | Mod: 26,S$PBB,, | Performed by: OBSTETRICS & GYNECOLOGY

## 2020-07-09 PROCEDURE — 87081 CULTURE SCREEN ONLY: CPT

## 2020-07-09 PROCEDURE — 99999 PR PBB SHADOW E&M-EST. PATIENT-LVL II: ICD-10-PCS | Mod: PBBFAC,,, | Performed by: MIDWIFE

## 2020-07-09 PROCEDURE — 99213 PR OFFICE/OUTPT VISIT, EST, LEVL III, 20-29 MIN: ICD-10-PCS | Mod: TH,S$PBB,, | Performed by: MIDWIFE

## 2020-07-09 NOTE — PROGRESS NOTES
29 y.o. female  at 37w0d   Reports + FM, denies VB, LOF or regular CTX  Doing well without concerns   TW lbs   BPP 8/8, MVP 3.7, JHONATHAN 6.6, mildly increased from Monday, encouraged to hydrate  GBS done today  Reviewed warning signs, normal FKCs, labor precautions and how/when to call.  RTC x 1 wks, call or present sooner prn.     The skin of the suprapubic region was evaluated and appears unremarkable. Counseled the patient to shower daily and to wash this area with an antibacterial soap such as Dial. Advised her not to shave the hair from this area from now until delivery. I also counseled the patient to place antibacterial hand soap in all bathrooms and kitchen to help facilitate proper hand hygiene practices before and after delivery.

## 2020-07-11 LAB — BACTERIA SPEC AEROBE CULT: NORMAL

## 2020-07-13 PROBLEM — O36.8190 DECREASED FETAL MOVEMENT: Status: ACTIVE | Noted: 2020-07-13

## 2020-07-15 PROBLEM — O28.8 AMNIOTIC FLUID INDEX BORDERLINE LOW: Status: ACTIVE | Noted: 2020-07-15

## 2020-07-16 ENCOUNTER — HOSPITAL ENCOUNTER (INPATIENT)
Facility: HOSPITAL | Age: 30
LOS: 4 days | Discharge: HOME | End: 2020-07-20
Attending: OBSTETRICS & GYNECOLOGY | Admitting: OBSTETRICS & GYNECOLOGY
Payer: MEDICAID

## 2020-07-16 ENCOUNTER — ROUTINE PRENATAL (OUTPATIENT)
Dept: OBSTETRICS AND GYNECOLOGY | Facility: CLINIC | Age: 30
End: 2020-07-16
Payer: MEDICAID

## 2020-07-16 ENCOUNTER — PROCEDURE VISIT (OUTPATIENT)
Dept: OBSTETRICS AND GYNECOLOGY | Facility: CLINIC | Age: 30
End: 2020-07-16
Payer: MEDICAID

## 2020-07-16 VITALS
WEIGHT: 281.31 LBS | BODY MASS INDEX: 49.83 KG/M2 | DIASTOLIC BLOOD PRESSURE: 76 MMHG | SYSTOLIC BLOOD PRESSURE: 108 MMHG

## 2020-07-16 DIAGNOSIS — O28.3 ABNORMAL FETAL ULTRASOUND: ICD-10-CM

## 2020-07-16 DIAGNOSIS — O99.810 ABNORMAL O'SULLIVAN GLUCOSE CHALLENGE TEST, ANTEPARTUM: ICD-10-CM

## 2020-07-16 DIAGNOSIS — O36.8130 DECREASED FETAL MOVEMENTS IN THIRD TRIMESTER, SINGLE OR UNSPECIFIED FETUS: Primary | ICD-10-CM

## 2020-07-16 DIAGNOSIS — Z3A.38 38 WEEKS GESTATION OF PREGNANCY: ICD-10-CM

## 2020-07-16 DIAGNOSIS — Z92.89 H/O FETAL BIOPHYSICAL PROFILE: ICD-10-CM

## 2020-07-16 DIAGNOSIS — O99.210 OBESITY IN PREGNANCY: ICD-10-CM

## 2020-07-16 LAB
ABO + RH BLD: NORMAL
BASOPHILS # BLD AUTO: 0.03 K/UL (ref 0–0.2)
BASOPHILS NFR BLD: 0.3 % (ref 0–1.9)
BLD GP AB SCN CELLS X3 SERPL QL: NORMAL
DIFFERENTIAL METHOD: NORMAL
EOSINOPHIL # BLD AUTO: 0.1 K/UL (ref 0–0.5)
EOSINOPHIL NFR BLD: 0.6 % (ref 0–8)
ERYTHROCYTE [DISTWIDTH] IN BLOOD BY AUTOMATED COUNT: 13.5 % (ref 11.5–14.5)
HCT VFR BLD AUTO: 40.6 % (ref 37–48.5)
HGB BLD-MCNC: 13.3 G/DL (ref 12–16)
IMM GRANULOCYTES # BLD AUTO: 0.04 K/UL (ref 0–0.04)
IMM GRANULOCYTES NFR BLD AUTO: 0.4 % (ref 0–0.5)
LYMPHOCYTES # BLD AUTO: 2.3 K/UL (ref 1–4.8)
LYMPHOCYTES NFR BLD: 23.6 % (ref 18–48)
MCH RBC QN AUTO: 30.2 PG (ref 27–31)
MCHC RBC AUTO-ENTMCNC: 32.8 G/DL (ref 32–36)
MCV RBC AUTO: 92 FL (ref 82–98)
MONOCYTES # BLD AUTO: 0.7 K/UL (ref 0.3–1)
MONOCYTES NFR BLD: 6.9 % (ref 4–15)
NEUTROPHILS # BLD AUTO: 6.5 K/UL (ref 1.8–7.7)
NEUTROPHILS NFR BLD: 68.2 % (ref 38–73)
NRBC BLD-RTO: 0 /100 WBC
PLATELET # BLD AUTO: 224 K/UL (ref 150–350)
PMV BLD AUTO: 12.4 FL (ref 9.2–12.9)
RBC # BLD AUTO: 4.41 M/UL (ref 4–5.4)
SARS-COV-2 RDRP RESP QL NAA+PROBE: NEGATIVE
WBC # BLD AUTO: 9.58 K/UL (ref 3.9–12.7)

## 2020-07-16 PROCEDURE — 99213 OFFICE O/P EST LOW 20 MIN: CPT | Mod: TH,S$PBB,, | Performed by: MIDWIFE

## 2020-07-16 PROCEDURE — 99213 PR OFFICE/OUTPT VISIT, EST, LEVL III, 20-29 MIN: ICD-10-PCS | Mod: TH,S$PBB,, | Performed by: MIDWIFE

## 2020-07-16 PROCEDURE — 85025 COMPLETE CBC W/AUTO DIFF WBC: CPT

## 2020-07-16 PROCEDURE — 72100003 HC LABOR CARE, EA. ADDL. 8 HRS

## 2020-07-16 PROCEDURE — 99212 OFFICE O/P EST SF 10 MIN: CPT | Mod: PBBFAC,TH,25 | Performed by: MIDWIFE

## 2020-07-16 PROCEDURE — 99999 PR PBB SHADOW E&M-EST. PATIENT-LVL II: CPT | Mod: PBBFAC,,, | Performed by: MIDWIFE

## 2020-07-16 PROCEDURE — 99999 PR PBB SHADOW E&M-EST. PATIENT-LVL II: ICD-10-PCS | Mod: PBBFAC,,, | Performed by: MIDWIFE

## 2020-07-16 PROCEDURE — 76819 FETAL BIOPHYS PROFIL W/O NST: CPT | Mod: PBBFAC | Performed by: OBSTETRICS & GYNECOLOGY

## 2020-07-16 PROCEDURE — 11000001 HC ACUTE MED/SURG PRIVATE ROOM

## 2020-07-16 PROCEDURE — 72100002 HC LABOR CARE, 1ST 8 HOURS

## 2020-07-16 PROCEDURE — 76819 PR US, OB, FETAL BIOPHYSICAL, W/O NST: ICD-10-PCS | Mod: 26,S$PBB,, | Performed by: OBSTETRICS & GYNECOLOGY

## 2020-07-16 PROCEDURE — 86901 BLOOD TYPING SEROLOGIC RH(D): CPT

## 2020-07-16 PROCEDURE — U0002 COVID-19 LAB TEST NON-CDC: HCPCS

## 2020-07-16 PROCEDURE — 25000003 PHARM REV CODE 250: Performed by: ADVANCED PRACTICE MIDWIFE

## 2020-07-16 PROCEDURE — 76819 FETAL BIOPHYS PROFIL W/O NST: CPT | Mod: 26,S$PBB,, | Performed by: OBSTETRICS & GYNECOLOGY

## 2020-07-16 RX ORDER — SIMETHICONE 80 MG
1 TABLET,CHEWABLE ORAL 4 TIMES DAILY PRN
Status: DISCONTINUED | OUTPATIENT
Start: 2020-07-16 | End: 2020-07-20 | Stop reason: HOSPADM

## 2020-07-16 RX ORDER — OXYTOCIN/RINGER'S LACTATE 30/500 ML
334 PLASTIC BAG, INJECTION (ML) INTRAVENOUS ONCE
Status: COMPLETED | OUTPATIENT
Start: 2020-07-16 | End: 2020-07-18

## 2020-07-16 RX ORDER — CALCIUM CARBONATE 200(500)MG
500 TABLET,CHEWABLE ORAL 3 TIMES DAILY PRN
Status: DISCONTINUED | OUTPATIENT
Start: 2020-07-16 | End: 2020-07-20 | Stop reason: HOSPADM

## 2020-07-16 RX ORDER — SODIUM CHLORIDE 9 MG/ML
INJECTION, SOLUTION INTRAVENOUS CONTINUOUS
Status: DISCONTINUED | OUTPATIENT
Start: 2020-07-16 | End: 2020-07-20 | Stop reason: HOSPADM

## 2020-07-16 RX ORDER — ONDANSETRON 8 MG/1
8 TABLET, ORALLY DISINTEGRATING ORAL EVERY 8 HOURS PRN
Status: DISCONTINUED | OUTPATIENT
Start: 2020-07-16 | End: 2020-07-20 | Stop reason: HOSPADM

## 2020-07-16 RX ORDER — BUTORPHANOL TARTRATE 2 MG/ML
2 INJECTION INTRAMUSCULAR; INTRAVENOUS
Status: DISCONTINUED | OUTPATIENT
Start: 2020-07-16 | End: 2020-07-20 | Stop reason: HOSPADM

## 2020-07-16 RX ORDER — BUTORPHANOL TARTRATE 2 MG/ML
1 INJECTION INTRAMUSCULAR; INTRAVENOUS
Status: DISCONTINUED | OUTPATIENT
Start: 2020-07-16 | End: 2020-07-20 | Stop reason: HOSPADM

## 2020-07-16 RX ORDER — OXYTOCIN/RINGER'S LACTATE 30/500 ML
95 PLASTIC BAG, INJECTION (ML) INTRAVENOUS ONCE
Status: DISCONTINUED | OUTPATIENT
Start: 2020-07-16 | End: 2020-07-20 | Stop reason: HOSPADM

## 2020-07-16 RX ADMIN — DINOPROSTONE 10 MG: 10 INSERT VAGINAL at 02:07

## 2020-07-16 NOTE — PROGRESS NOTES
29 y.o. female  at 38w0d   Reports + FM, denies VB, LOF or regular CTX  Doing well without concerns   BPP 4/8, MVP 4.9, JHONATHAN 7.8, 2 off for tone and 2 off for gross body movements, pt reports good fetal movement, in light of the previous hospital visit for decreased fetal movement, borderline low amniotic fluid, discussed case w/ Dr Donahue, recommend delivery, discussed w/ pt findings and recommendations, will go to hospital after visit for IOL, CNM on call notified  Cervix soft, vertex high  TW lbs   Reviewed warning signs, normal FKCs, labor precautions and how/when to call.  RTC x 4 wks PP, call or present sooner prn.

## 2020-07-16 NOTE — H&P
[facility] -   Obstetrics  History & Physical    Patient Name: Naseem Moura  MRN: [id number]  Admission Date: 2020  Primary Care Provider: Primary Doctor No    Subjective:     Principal Problem:H/O fetal biophysical profile    History of Present Illness:  Sent from office due to BPP 4/ - movement and tone    Obstetric HPI:  Patient reports None contractions, active fetal movement, Yes vaginal bleeding , No loss of fluid     This pregnancy has been complicated by   Obesity in pregnancy  Abnormal 1 hour GTT - 3 hour normal      OB History    Para Term  AB Living   2 0 0 0 1 0   SAB TAB Ectopic Multiple Live Births   1 0 0 0 0      # Outcome Date GA Lbr Art/2nd Weight Sex Delivery Anes PTL Lv   2 Current            1 SAB 2018 6w0d            No past medical history on file.  No past surgical history on file.    PTA Medications   Medication Sig    aspirin (ECOTRIN) 81 MG EC tablet Take 1 tablet (81 mg total) by mouth once daily.    -IRON-FOLATE 1-DSS-DHA ORAL Take 1 capsule by mouth Daily.       Review of patient's allergies indicates:  No Known Allergies     Family History     Problem Relation (Age of Onset)    Hypertension Mother        Tobacco Use    Smoking status: Never Smoker    Smokeless tobacco: Never Used   Substance and Sexual Activity    Alcohol use: Never     Frequency: Never    Drug use: Never    Sexual activity: Yes     Partners: Male     Review of Systems   Respiratory: Negative for cough and shortness of breath.    Gastrointestinal: Negative for abdominal pain.   Genitourinary: Negative for vaginal bleeding and vaginal discharge.   All other systems reviewed and are negative.     Objective:     Vital Signs (Most Recent):  Temp: 98.6 °F (37 °C) (20 1115)  Pulse: 85 (20 1630)  Resp: 18 (20 1440)  BP: (!) 98/45 (20 1610)  SpO2: 98 % (20 1630) Vital Signs (24h Range):  Temp:  [98.6 °F (37 °C)] 98.6 °F (37 °C)  Pulse:   [] 85  Resp:  [18] 18  SpO2:  [94 %-99 %] 98 %  BP: ()/(45-76) 98/45        There is no height or weight on file to calculate BMI.    FHT: 145Cat 1 (reassuring)  TOCO: none noted      Physical Exam:   Constitutional: She is oriented to person, place, and time. She appears well-developed and well-nourished.    HENT:   Head: Normocephalic and atraumatic.     Neck: Normal range of motion. Neck supple.    Cardiovascular: Normal rate.     Pulmonary/Chest: Effort normal.        Abdominal: Soft. There is no abdominal tenderness.   gravid             Musculoskeletal: Normal range of motion and moves all extremeties.       Neurological: She is alert and oriented to person, place, and time.    Skin: Skin is warm and dry.    Psychiatric: She has a normal mood and affect. Her behavior is normal. Judgment and thought content normal.       Cervix:  Dilation:  1  Effacement:  50%  Station: -3  Presentation: Vertex     Significant Labs:  Lab Results   Component Value Date    GROUPTRH O POS 2020    HEPBSAG Negative 2019    STREPBCULT No Group B Streptococcus isolated 2020       I have personallly reviewed all pertinent lab results from the last 24 hours.    Assessment/Plan:     29 y.o. female  at 38w0d for:    * H/O fetal biophysical profile  Admit for IOL.  Cooks balloon not available on unit - will begin IOL with Cervidil.  R/B/A discussed with patient - patient wishes to continue.  JOAO upon pt request.  Continue to monitor.        Sam Ford CNM  Obstetrics  [facility] - [facility]

## 2020-07-16 NOTE — PROGRESS NOTES
"S:Pt states feeling "a little crampy"  O:  VS reviewed, afebrile   Vitals:    07/16/20 1540 07/16/20 1600 07/16/20 1610 07/16/20 1630   BP: 115/63  (!) 98/45    Pulse: 94 87 88 85   Resp:       Temp:       TempSrc:       SpO2: 96% 98% (!) 94% 98%       FHTs 145  UC irritability noted  SVE deferred      A: IUP @ 38w0d ;     Patient Active Problem List   Diagnosis    Cervical high risk HPV (human papillomavirus) test positive    Pyelectasis of fetus on prenatal ultrasound    Obesity in pregnancy    Abnormal [place] glucose challenge test, antepartum    Decreased fetal movement    Amniotic fluid index borderline low    H/O fetal biophysical profile       P:   Continue close monitoring of maternal/fetal status  Continue with Cervidil [facility]    "

## 2020-07-16 NOTE — ASSESSMENT & PLAN NOTE
Admit for IOL.  Cooks balloon not available on unit - will begin IOL with Cervidil.  R/B/A discussed with patient - patient wishes to continue.  JOAO upon pt request.  Continue to monitor.

## 2020-07-16 NOTE — HOSPITAL COURSE
Admit for IOL.  Cooks balloon not available on unit - will begin IOL with Cervidil.  R/B/A discussed with patient - patient wishes to continue.  JOAO upon pt request.  Continue to monitor.  7/17/20 0745 resting after sedation, Cat 1 tracing, pitocin infusing  07/18/20: AROM of clear fluid, pitocin infusing, epidural in place  7/19/20--PPD #1, normal PP course, d/c home tomorrow  07/20/20: PPD2, doing well, without complaints, d/c to home

## 2020-07-16 NOTE — SUBJECTIVE & OBJECTIVE
Obstetric HPI:  Patient reports None contractions, active fetal movement, Yes vaginal bleeding , No loss of fluid     This pregnancy has been complicated by   Obesity in pregnancy  Abnormal 1 hour GTT - 3 hour normal      OB History    Para Term  AB Living   2 0 0 0 1 0   SAB TAB Ectopic Multiple Live Births   1 0 0 0 0      # Outcome Date GA Lbr Art/2nd Weight Sex Delivery Anes PTL Lv   2 Current            1 SAB 2018 6w0d            No past medical history on file.  No past surgical history on file.    PTA Medications   Medication Sig    aspirin (ECOTRIN) 81 MG EC tablet Take 1 tablet (81 mg total) by mouth once daily.    -IRON-FOLATE 1-DSS-DHA ORAL Take 1 capsule by mouth Daily.       Review of patient's allergies indicates:  No Known Allergies     Family History     Problem Relation (Age of Onset)    Hypertension Mother        Tobacco Use    Smoking status: Never Smoker    Smokeless tobacco: Never Used   Substance and Sexual Activity    Alcohol use: Never     Frequency: Never    Drug use: Never    Sexual activity: Yes     Partners: Male     Review of Systems   Respiratory: Negative for cough and shortness of breath.    Gastrointestinal: Negative for abdominal pain.   Genitourinary: Negative for vaginal bleeding and vaginal discharge.   All other systems reviewed and are negative.     Objective:     Vital Signs (Most Recent):  Temp: 98.6 °F (37 °C) (20 1115)  Pulse: 85 (20 1630)  Resp: 18 (20 1440)  BP: (!) 98/45 (20 1610)  SpO2: 98 % (20 1630) Vital Signs (24h Range):  Temp:  [98.6 °F (37 °C)] 98.6 °F (37 °C)  Pulse:  [] 85  Resp:  [18] 18  SpO2:  [94 %-99 %] 98 %  BP: ()/(45-76) 98/45        There is no height or weight on file to calculate BMI.    FHT: 145Cat 1 (reassuring)  TOCO: none noted      Physical Exam:   Constitutional: She is oriented to person, place, and time. She appears well-developed and well-nourished.    HENT:   Head:  Normocephalic and atraumatic.     Neck: Normal range of motion. Neck supple.    Cardiovascular: Normal rate.     Pulmonary/Chest: Effort normal.        Abdominal: Soft. There is no abdominal tenderness.   gravid             Musculoskeletal: Normal range of motion and moves all extremeties.       Neurological: She is alert and oriented to person, place, and time.    Skin: Skin is warm and dry.    Psychiatric: She has a normal mood and affect. Her behavior is normal. Judgment and thought content normal.       Cervix:  Dilation:  1  Effacement:  50%  Station: -3  Presentation: Vertex     Significant Labs:  Lab Results   Component Value Date    GROUPTRH O POS 07/16/2020    HEPBSAG Negative 12/16/2019    STREPBCULT No Group B Streptococcus isolated 07/09/2020       I have personallly reviewed all pertinent lab results from the last 24 hours.

## 2020-07-17 PROCEDURE — C1726 CATH, BAL DIL, NON-VASCULAR: HCPCS

## 2020-07-17 PROCEDURE — 63600175 PHARM REV CODE 636 W HCPCS: Performed by: ADVANCED PRACTICE MIDWIFE

## 2020-07-17 PROCEDURE — 72100003 HC LABOR CARE, EA. ADDL. 8 HRS

## 2020-07-17 PROCEDURE — 11000001 HC ACUTE MED/SURG PRIVATE ROOM

## 2020-07-17 RX ORDER — OXYTOCIN/RINGER'S LACTATE 30/500 ML
2 PLASTIC BAG, INJECTION (ML) INTRAVENOUS CONTINUOUS
Status: DISCONTINUED | OUTPATIENT
Start: 2020-07-17 | End: 2020-07-18

## 2020-07-17 RX ORDER — SODIUM CHLORIDE, SODIUM LACTATE, POTASSIUM CHLORIDE, CALCIUM CHLORIDE 600; 310; 30; 20 MG/100ML; MG/100ML; MG/100ML; MG/100ML
INJECTION, SOLUTION INTRAVENOUS CONTINUOUS
Status: DISCONTINUED | OUTPATIENT
Start: 2020-07-17 | End: 2020-07-18

## 2020-07-17 RX ADMIN — SODIUM CHLORIDE, SODIUM LACTATE, POTASSIUM CHLORIDE, AND CALCIUM CHLORIDE: .6; .31; .03; .02 INJECTION, SOLUTION INTRAVENOUS at 12:07

## 2020-07-17 RX ADMIN — BUTORPHANOL TARTRATE 2 MG: 2 INJECTION, SOLUTION INTRAMUSCULAR; INTRAVENOUS at 04:07

## 2020-07-17 RX ADMIN — Medication 2 MILLI-UNITS/MIN: at 04:07

## 2020-07-17 RX ADMIN — SODIUM CHLORIDE, SODIUM LACTATE, POTASSIUM CHLORIDE, AND CALCIUM CHLORIDE 1000 ML: .6; .31; .03; .02 INJECTION, SOLUTION INTRAVENOUS at 04:07

## 2020-07-17 RX ADMIN — BUTORPHANOL TARTRATE 2 MG: 2 INJECTION, SOLUTION INTRAMUSCULAR; INTRAVENOUS at 09:07

## 2020-07-17 NOTE — PROGRESS NOTES
S: pt smiling, ready for exam  O:  VS reviewed, afebrile   Vitals:    07/17/20 1706 07/17/20 1710 07/17/20 1746 07/17/20 1805   BP: (!) 140/105 (!) 147/80 134/85 139/89   Pulse: 93 83 86 84   Resp:       Temp:       TempSrc:       SpO2:           FHTs Cat 1 accels, moderate variability  UC Q 2-4 min  SVE 2/80/v/-2 BOW intact    A: IUP @ 38w1d ;     Patient Active Problem List   Diagnosis    Cervical high risk HPV (human papillomavirus) test positive    Pyelectasis of fetus on prenatal ultrasound    Obesity in pregnancy    Abnormal fetal ultrasound    Abnormal [place] glucose challenge test, antepartum    Decreased fetal movement    Amniotic fluid index borderline low    H/O fetal biophysical profile       P: d/c pitocin, regular diet, hibiclens shower, exp will place cooks catheter at 2200 and restart pitocin

## 2020-07-17 NOTE — PROGRESS NOTES
S: resting after stadol, discouraged at how long it is taking for labor to progress  O:  VS reviewed, afebrile   Vitals:    07/17/20 1110 07/17/20 1115 07/17/20 1120 07/17/20 1125   BP:       Pulse: 78 94 81 87   Resp:       Temp:       TempSrc:       SpO2: 95%  95% 95%       FHTs Cat 1 moderate variability +accels  UC Q 2-3 min  SVE 2/80/v/-3 pitocin at 22 mu/min    A: IUP @ 38w1d ;     Patient Active Problem List   Diagnosis    Cervical high risk HPV (human papillomavirus) test positive    Pyelectasis of fetus on prenatal ultrasound    Obesity in pregnancy    Abnormal fetal ultrasound    Abnormal [place] glucose challenge test, antepartum    Decreased fetal movement    Amniotic fluid index borderline low    H/O fetal biophysical profile       P:   Continue close monitoring of maternal/fetal status, long discussion about induction process, reassured she has made cervical change since pitocin started, baby is tolerating induction process. Discouraged repeating stadol since she has had 2 injections already, recommended getting out of bed, use exercise ball once sedation has worn off. Epidural if she desires. al

## 2020-07-17 NOTE — SUBJECTIVE & OBJECTIVE
Interval History:  Naseem is a 29 y.o.  at 38w1d. She is resting after sedation    Objective:     Vital Signs (Most Recent):  Temp: 98.2 °F (36.8 °C) (20 0736)  Pulse: 88 (20 0736)  Resp: 18 (20 1900)  BP: (!) 98/54 (20 0736)  SpO2: 96 % (20 0735) Vital Signs (24h Range):  Temp:  [98.2 °F (36.8 °C)-98.6 °F (37 °C)] 98.2 °F (36.8 °C)  Pulse:  [] 88  Resp:  [18] 18  SpO2:  [94 %-99 %] 96 %  BP: ()/(45-78) 98/54        There is no height or weight on file to calculate BMI.    FHT: Cat 1 (reassuring)  TOCO:  Q 2-3.5 minutes    Cervical Exam: deferred 1 cm per recent exam       Significant Labs:  Lab Results   Component Value Date    GROUPTRH O POS 2020    HEPBSAG Negative 2019    STREPBCULT No Group B Streptococcus isolated 2020       Recent Lab Results       20  1255   20  1127        Baso # 0.03       Basophil% 0.3       Differential Method Automated       Eos # 0.1       Eosinophil% 0.6       Gran # (ANC) 6.5       Gran% 68.2       Group & Rh O POS       Hematocrit 40.6       Hemoglobin 13.3       Immature Grans (Abs) 0.04  Comment:  Mild elevation in immature granulocytes is non specific and   can be seen in a variety of conditions including stress response,   acute inflammation, trauma and pregnancy. Correlation with other   laboratory and clinical findings is essential.         Immature Granulocytes 0.4       INDIRECT KARELY NEG       Lymph # 2.3       Lymph% 23.6       MCH 30.2       MCHC 32.8       MCV 92       Mono # 0.7       Mono% 6.9       MPV 12.4       nRBC 0       Platelets 224       RBC 4.41       RDW 13.5       SARS-CoV-2 RNA, Amplification, Qual   Negative  Comment:  This test utilizes isothermal nucleic acid amplification   technology to detect the SARS-CoV-2 RdRp nucleic acid segment.   The analytical sensitivity (limit of detection) is 125 genome   equivalents/mL.   A POSITIVE result implies infection with the SARS-CoV-2  virus;  the patient is presumed to be contagious.    A NEGATIVE result means that SARS-CoV-2 nucleic acids are not  present above the limit of detection. A NEGATIVE result should be   treated as presumptive. It does not rule out the possibility of   COVID-19 and should not be the sole basis for treatment decisions.   If COVID-19 is strongly suspected based on clinical and exposure   history, re-testing using an alternate molecular assay should be   considered.   This test is only for use under the Food and Drug   Administration s Emergency Use Authorization (EUA).   Commercial kits are provided by [employer].   Performance characteristics of the EUA have been independently  verified by [facility] Department of  Pathology and Laboratory Medicine.   _________________________________________________________________  The ID NOW COVID-19 Letter of Authorization, along with the   authorized Fact Sheet for Healthcare Providers, the authorized Fact  Sheet for Patients, and authorized labeling are available on the FDA   website:  www.fda.gov/MedicalDevices/Safety/EmergencySituations/vaj961836.htm       WBC 9.58             Physical Exam

## 2020-07-17 NOTE — PROGRESS NOTES
07/16/20 1901   TeleStork [facility] Note - Strip   Strip Reviewed by [place] Nurse? Yes   TeleStork [facility] Note - Communication   [place] Nurse Communicated with Bedside Nurse Regarding: Fetal Status   TeleStork [facility] Note - Notification   Nurse Notified? Yes  (called unit and spoke with Sonya RE no FHR tracing, RN at bedside now)   Name of Nurse Sonya   Doctor Notified? No

## 2020-07-17 NOTE — PROGRESS NOTES
S: Patient states occasional cramping  O:Patient resting  VS reviewed, afebrile   Vitals:    07/16/20 1905 07/16/20 1906 07/16/20 2206 07/17/20 0445   BP:  (!) 109/54 (!) 110/49    Pulse: 95 86 84    Resp:       Temp:    98.3 °F (36.8 °C)   TempSrc:    Oral   SpO2: 98%          FHTs 135bpm  UC 4-6  SVE ; deferred  A: IUP @ 38w1d ;     Patient Active Problem List   Diagnosis    Cervical high risk HPV (human papillomavirus) test positive    Pyelectasis of fetus on prenatal ultrasound    Obesity in pregnancy    Abnormal [place] glucose challenge test, antepartum    Decreased fetal movement    Amniotic fluid index borderline low    H/O fetal biophysical profile       P:   Continue close monitoring of maternal/fetal status.  Kilo KHOURY

## 2020-07-17 NOTE — PROGRESS NOTES
07/17/20 1339   TeleStork [place] Note - Strip   Strip Reviewed by [place] Nurse? Yes   TeleStork [place] Note - Communication   [place] Nurse Communicated with Bedside Nurse Regarding: Contraction Pattern   TeleStork [place] Note - Notification   Nurse Notified? Yes   Name of Nurse Zaira ALBERTS

## 2020-07-17 NOTE — PROGRESS NOTES
[facility]  Obstetrics  Labor Progress Note    Patient Name: Naseem Moura  MRN: [id number]  Admission Date: 2020  Hospital Length of Stay: 1 days  Attending Physician: Veronica Rios MD  Primary Care Provider: Primary Doctor No    Subjective:     Principal Problem:Abnormal fetal ultrasound    Hospital Course:  Admit for IOL.  Cooks balloon not available on unit - will begin IOL with Cervidil.  R/B/A discussed with patient - patient wishes to continue.  JOAO upon pt request.  Continue to monitor.  20 0745 resting after sedation, Cat 1 tracing, pitocin infusing    Interval History:  Naseem is a 29 y.o.  at 38w1d. She is resting after sedation    Objective:     Vital Signs (Most Recent):  Temp: 98.2 °F (36.8 °C) (20 0736)  Pulse: 88 (20 0736)  Resp: 18 (20 1900)  BP: (!) 98/54 (20 0736)  SpO2: 96 % (20 0735) Vital Signs (24h Range):  Temp:  [98.2 °F (36.8 °C)-98.6 °F (37 °C)] 98.2 °F (36.8 °C)  Pulse:  [] 88  Resp:  [18] 18  SpO2:  [94 %-99 %] 96 %  BP: ()/(45-78) 98/54        There is no height or weight on file to calculate BMI.    FHT: Cat 1 (reassuring)  TOCO:  Q 2-3.5 minutes    Cervical Exam: deferred 1 cm per recent exam       Significant Labs:  Lab Results   Component Value Date    GROUPTRH O POS 2020    HEPBSAG Negative 2019    STREPBCULT No Group B Streptococcus isolated 2020       Recent Lab Results       20  1255   20  1127        Baso # 0.03       Basophil% 0.3       Differential Method Automated       Eos # 0.1       Eosinophil% 0.6       Gran # (ANC) 6.5       Gran% 68.2       Group & Rh O POS       Hematocrit 40.6       Hemoglobin 13.3       Immature Grans (Abs) 0.04  Comment:  Mild elevation in immature granulocytes is non specific and   can be seen in a variety of conditions including stress response,   acute inflammation, trauma and pregnancy. Correlation with other   laboratory and  clinical findings is essential.         Immature Granulocytes 0.4       INDIRECT KARELY NEG       Lymph # 2.3       Lymph% 23.6       MCH 30.2       MCHC 32.8       MCV 92       Mono # 0.7       Mono% 6.9       MPV 12.4       nRBC 0       Platelets 224       RBC 4.41       RDW 13.5       SARS-CoV-2 RNA, Amplification, Qual   Negative  Comment:  This test utilizes isothermal nucleic acid amplification   technology to detect the SARS-CoV-2 RdRp nucleic acid segment.   The analytical sensitivity (limit of detection) is 125 genome   equivalents/mL.   A POSITIVE result implies infection with the SARS-CoV-2 virus;  the patient is presumed to be contagious.    A NEGATIVE result means that SARS-CoV-2 nucleic acids are not  present above the limit of detection. A NEGATIVE result should be   treated as presumptive. It does not rule out the possibility of   COVID-19 and should not be the sole basis for treatment decisions.   If COVID-19 is strongly suspected based on clinical and exposure   history, re-testing using an alternate molecular assay should be   considered.   This test is only for use under the Food and Drug   Administration s Emergency Use Authorization (EUA).   Commercial kits are provided by [employer].   Performance characteristics of the EUA have been independently  verified by [facility] Department of  Pathology and Laboratory Medicine.   _________________________________________________________________  The ID NOW COVID-19 Letter of Authorization, along with the   authorized Fact Sheet for Healthcare Providers, the authorized Fact  Sheet for Patients, and authorized labeling are available on the FDA   website:  www.fda.gov/MedicalDevices/Safety/EmergencySituations/irs179548.htm       WBC 9.58             Physical Exam    Assessment/Plan:     29 y.o. female  at 38w1d for:    * Abnormal fetal ultrasound  Pitocin induction of labor  BPP 4/8 in office yesterday    H/O fetal biophysical  profile  Admitted for IOL.  Continue to monitor.          Kaila Trinidad CNM  Obstetrics  [facility]

## 2020-07-18 ENCOUNTER — ANESTHESIA EVENT (OUTPATIENT)
Dept: OBSTETRICS AND GYNECOLOGY | Facility: HOSPITAL | Age: 30
End: 2020-07-18
Payer: MEDICAID

## 2020-07-18 ENCOUNTER — ANESTHESIA (OUTPATIENT)
Dept: OBSTETRICS AND GYNECOLOGY | Facility: HOSPITAL | Age: 30
End: 2020-07-18
Payer: MEDICAID

## 2020-07-18 VITALS — DIASTOLIC BLOOD PRESSURE: 70 MMHG | OXYGEN SATURATION: 98 % | SYSTOLIC BLOOD PRESSURE: 130 MMHG

## 2020-07-18 PROBLEM — Z34.90 ENCOUNTER FOR INDUCTION OF LABOR: Status: ACTIVE | Noted: 2020-07-18

## 2020-07-18 PROBLEM — Z34.90 ENCOUNTER FOR INDUCTION OF LABOR: Status: RESOLVED | Noted: 2020-07-18 | Resolved: 2020-07-18

## 2020-07-18 LAB
ANION GAP SERPL CALC-SCNC: 10 MMOL/L (ref 8–16)
BUN SERPL-MCNC: 7 MG/DL (ref 6–20)
CALCIUM SERPL-MCNC: 8.8 MG/DL (ref 8.7–10.5)
CHLORIDE SERPL-SCNC: 106 MMOL/L (ref 95–110)
CO2 SERPL-SCNC: 16 MMOL/L (ref 23–29)
CREAT SERPL-MCNC: 0.7 MG/DL (ref 0.5–1.4)
EST. GFR  (AFRICAN AMERICAN): >60 ML/MIN/1.73 M^2
EST. GFR  (NON AFRICAN AMERICAN): >60 ML/MIN/1.73 M^2
GLUCOSE SERPL-MCNC: 130 MG/DL (ref 70–110)
POTASSIUM SERPL-SCNC: 3.9 MMOL/L (ref 3.5–5.1)
SODIUM SERPL-SCNC: 132 MMOL/L (ref 136–145)

## 2020-07-18 PROCEDURE — 63600175 PHARM REV CODE 636 W HCPCS: Performed by: ANESTHESIOLOGY

## 2020-07-18 PROCEDURE — 63600175 PHARM REV CODE 636 W HCPCS: Performed by: ADVANCED PRACTICE MIDWIFE

## 2020-07-18 PROCEDURE — 72200005 HC VAGINAL DELIVERY LEVEL II

## 2020-07-18 PROCEDURE — 72100003 HC LABOR CARE, EA. ADDL. 8 HRS

## 2020-07-18 PROCEDURE — 59409 OBSTETRICAL CARE: CPT | Mod: AT,,, | Performed by: MIDWIFE

## 2020-07-18 PROCEDURE — 51701 INSERT BLADDER CATHETER: CPT

## 2020-07-18 PROCEDURE — 27800516 HC TRAY, EPIDURAL COMBO: Performed by: ANESTHESIOLOGY

## 2020-07-18 PROCEDURE — 25000003 PHARM REV CODE 250: Performed by: NURSE ANESTHETIST, CERTIFIED REGISTERED

## 2020-07-18 PROCEDURE — 59409 PR OBSTETRICAL CARE,VAG DELIV ONLY: ICD-10-PCS | Mod: AT,,, | Performed by: MIDWIFE

## 2020-07-18 PROCEDURE — 80048 BASIC METABOLIC PNL TOTAL CA: CPT

## 2020-07-18 PROCEDURE — 11000001 HC ACUTE MED/SURG PRIVATE ROOM

## 2020-07-18 PROCEDURE — 25000003 PHARM REV CODE 250: Performed by: MIDWIFE

## 2020-07-18 PROCEDURE — C1726 CATH, BAL DIL, NON-VASCULAR: HCPCS

## 2020-07-18 PROCEDURE — 27200710 HC EPIDURAL INFUSION PUMP SET: Performed by: ANESTHESIOLOGY

## 2020-07-18 PROCEDURE — 62326 NJX INTERLAMINAR LMBR/SAC: CPT | Performed by: NURSE ANESTHETIST, CERTIFIED REGISTERED

## 2020-07-18 PROCEDURE — 63600175 PHARM REV CODE 636 W HCPCS: Performed by: NURSE ANESTHETIST, CERTIFIED REGISTERED

## 2020-07-18 RX ORDER — DOCUSATE SODIUM 100 MG/1
200 CAPSULE, LIQUID FILLED ORAL 2 TIMES DAILY PRN
Status: DISCONTINUED | OUTPATIENT
Start: 2020-07-18 | End: 2020-07-20 | Stop reason: HOSPADM

## 2020-07-18 RX ORDER — DIPHENHYDRAMINE HYDROCHLORIDE 50 MG/ML
25 INJECTION INTRAMUSCULAR; INTRAVENOUS EVERY 4 HOURS PRN
Status: DISCONTINUED | OUTPATIENT
Start: 2020-07-18 | End: 2020-07-20 | Stop reason: HOSPADM

## 2020-07-18 RX ORDER — ROPIVACAINE HYDROCHLORIDE 2 MG/ML
INJECTION, SOLUTION EPIDURAL; INFILTRATION CONTINUOUS
Status: DISCONTINUED | OUTPATIENT
Start: 2020-07-18 | End: 2020-07-18

## 2020-07-18 RX ORDER — HYDROCODONE BITARTRATE AND ACETAMINOPHEN 5; 325 MG/1; MG/1
1 TABLET ORAL EVERY 4 HOURS PRN
Status: DISCONTINUED | OUTPATIENT
Start: 2020-07-18 | End: 2020-07-20 | Stop reason: HOSPADM

## 2020-07-18 RX ORDER — ENOXAPARIN SODIUM 100 MG/ML
40 INJECTION SUBCUTANEOUS
Status: DISCONTINUED | OUTPATIENT
Start: 2020-07-19 | End: 2020-07-20 | Stop reason: HOSPADM

## 2020-07-18 RX ORDER — DIPHENHYDRAMINE HCL 25 MG
25 CAPSULE ORAL EVERY 4 HOURS PRN
Status: DISCONTINUED | OUTPATIENT
Start: 2020-07-18 | End: 2020-07-20 | Stop reason: HOSPADM

## 2020-07-18 RX ORDER — ACETAMINOPHEN 500 MG
1000 TABLET ORAL EVERY 6 HOURS PRN
Status: DISCONTINUED | OUTPATIENT
Start: 2020-07-18 | End: 2020-07-18 | Stop reason: SDUPTHER

## 2020-07-18 RX ORDER — HYDROCORTISONE 25 MG/G
CREAM TOPICAL 3 TIMES DAILY PRN
Status: DISCONTINUED | OUTPATIENT
Start: 2020-07-18 | End: 2020-07-20 | Stop reason: HOSPADM

## 2020-07-18 RX ORDER — IBUPROFEN 600 MG/1
600 TABLET ORAL EVERY 6 HOURS
Status: DISCONTINUED | OUTPATIENT
Start: 2020-07-18 | End: 2020-07-20 | Stop reason: HOSPADM

## 2020-07-18 RX ORDER — SODIUM CITRATE AND CITRIC ACID MONOHYDRATE 334; 500 MG/5ML; MG/5ML
30 SOLUTION ORAL ONCE
Status: DISCONTINUED | OUTPATIENT
Start: 2020-07-18 | End: 2020-07-18

## 2020-07-18 RX ORDER — ONDANSETRON 8 MG/1
8 TABLET, ORALLY DISINTEGRATING ORAL EVERY 8 HOURS PRN
Status: DISCONTINUED | OUTPATIENT
Start: 2020-07-18 | End: 2020-07-20 | Stop reason: HOSPADM

## 2020-07-18 RX ORDER — ACETAMINOPHEN 325 MG/1
650 TABLET ORAL EVERY 6 HOURS PRN
Status: DISCONTINUED | OUTPATIENT
Start: 2020-07-18 | End: 2020-07-20 | Stop reason: HOSPADM

## 2020-07-18 RX ORDER — LIDOCAINE HYDROCHLORIDE AND EPINEPHRINE 15; 5 MG/ML; UG/ML
INJECTION, SOLUTION EPIDURAL
Status: DISCONTINUED | OUTPATIENT
Start: 2020-07-18 | End: 2020-07-18

## 2020-07-18 RX ORDER — FAMOTIDINE 10 MG/ML
20 INJECTION INTRAVENOUS ONCE
Status: DISCONTINUED | OUTPATIENT
Start: 2020-07-18 | End: 2020-07-18

## 2020-07-18 RX ORDER — ROPIVACAINE HYDROCHLORIDE 2 MG/ML
INJECTION, SOLUTION EPIDURAL; INFILTRATION; PERINEURAL
Status: DISCONTINUED | OUTPATIENT
Start: 2020-07-18 | End: 2020-07-18

## 2020-07-18 RX ORDER — OXYTOCIN/RINGER'S LACTATE 30/500 ML
41.65 PLASTIC BAG, INJECTION (ML) INTRAVENOUS CONTINUOUS
Status: DISPENSED | OUTPATIENT
Start: 2020-07-18 | End: 2020-07-18

## 2020-07-18 RX ADMIN — ROPIVACAINE HYDROCHLORIDE 8 ML: 2 INJECTION, SOLUTION EPIDURAL; INFILTRATION at 02:07

## 2020-07-18 RX ADMIN — ACETAMINOPHEN 1000 MG: 500 TABLET ORAL at 05:07

## 2020-07-18 RX ADMIN — ROPIVACAINE HYDROCHLORIDE 14 ML/HR: 2 INJECTION, SOLUTION EPIDURAL; INFILTRATION at 10:07

## 2020-07-18 RX ADMIN — SODIUM CHLORIDE, SODIUM LACTATE, POTASSIUM CHLORIDE, AND CALCIUM CHLORIDE: .6; .31; .03; .02 INJECTION, SOLUTION INTRAVENOUS at 09:07

## 2020-07-18 RX ADMIN — ROPIVACAINE HYDROCHLORIDE 14 ML/HR: 2 INJECTION, SOLUTION EPIDURAL; INFILTRATION at 02:07

## 2020-07-18 RX ADMIN — IBUPROFEN 600 MG: 600 TABLET, FILM COATED ORAL at 11:07

## 2020-07-18 RX ADMIN — Medication 2 MILLI-UNITS/MIN: at 12:07

## 2020-07-18 RX ADMIN — IBUPROFEN 600 MG: 600 TABLET, FILM COATED ORAL at 07:07

## 2020-07-18 RX ADMIN — SODIUM CHLORIDE, SODIUM LACTATE, POTASSIUM CHLORIDE, AND CALCIUM CHLORIDE: .6; .31; .03; .02 INJECTION, SOLUTION INTRAVENOUS at 12:07

## 2020-07-18 RX ADMIN — Medication 999 MILLI-UNITS/MIN: at 04:07

## 2020-07-18 RX ADMIN — LIDOCAINE HYDROCHLORIDE,EPINEPHRINE BITARTRATE 3 ML: 15; .005 INJECTION, SOLUTION EPIDURAL; INFILTRATION; INTRACAUDAL; PERINEURAL at 02:07

## 2020-07-18 NOTE — L&D DELIVERY NOTE
"[facility]  Vaginal Delivery   Operative Note    SUMMARY     Normal spontaneous vaginal delivery of live infant "Fuentes", was placed on mothers abdomen for skin to skin and bulb suctioning performed. Delayed cord clamping observed, and cord clamped and cut. Baby brought to warmer by transition nurse for deep suctioning.  Infant delivered position GILBERTO over perineum.  Loose nuchal cord: Yes, cord reduced following delivery.    Spontaneous delivery of placenta and IV pitocin given noting intermittent uterine bogginess. Firm to fundal massage. 800 mcg PO Cytotec given.  2nd degree laceration noted and repaired in normal fashion with Chromic CT and SH suture. Left vaginal wall and bleeders x2 repaired as well. Excellent hemostasis and approximation noted.  Patient tolerated delivery well. Sponge needle and lap counted correctly x2. Fundus firm, and small amount of vaginal bleeding present. Mom and baby stable. Baby placed back on mom's chest for skin to skin.  ml.     Indications:  (spontaneous vaginal delivery)  Pregnancy complicated by:   Patient Active Problem List   Diagnosis    Cervical high risk HPV (human papillomavirus) test positive    Pyelectasis of fetus on prenatal ultrasound    Obesity in pregnancy    Abnormal fetal ultrasound    Abnormal [place] glucose challenge test, antepartum    Decreased fetal movement    Amniotic fluid index borderline low    H/O fetal biophysical profile     (spontaneous vaginal delivery)    Single live birth    Obstetric vaginal laceration with second degree perineal laceration     Admitting GA: 38w2d    Delivery Information for Donaldo Moura    Birth information:  YOB: 2022   Time of birth: 4:41 PM   Sex: male   Head Delivery Date/Time: 2020  4:40 PM   Delivery type: Vaginal, Spontaneous   Gestational Age: 38w2d    Delivery Providers    Delivering clinician: Moon Nj CNM   Provider Role    Martha " LEXUS [facility] Terri P. Leblanc, RN             Measurements    Weight: 3340 g  Length:          Apgars    Living status: Living  Apgars:  1 min.:  5 min.:  10 min.:  15 min.:  20 min.:    Skin color:  0  1       Heart rate:  2  2       Reflex irritability:  2  2       Muscle tone:  2  2       Respiratory effort:  1  2       Total:  7  9       Apgars assigned by: SCARLETT HINTON RN         Operative Delivery    Forceps attempted?: No  Vacuum extractor attempted?: No         Shoulder Dystocia    Shoulder dystocia present?: No           Presentation    Presentation: Vertex  Position: Left Occiput Anterior           Interventions/Resuscitation    Method: Bulb Suctioning, Tactile Stimulation, Deep Suctioning       Cord    Vessels: 3 vessels  Complications: Nuchal  Nuchal Intervention: reduced  Nuchal Cord Description: loose nuchal cord  Number of Loops: 1  Delayed Cord Clamping?: Yes  Cord Clamped Date/Time: 2020  4:43 PM  Cord Blood Disposition: Lab  Gases Sent?: No  Stem Cell Collection (by MD): No       Placenta    Placenta delivery date/time: 2020 1644  Placenta removal: Spontaneous  Placenta appearance: Intact  Placenta disposition: discarded           Labor Events:       labor: No     Labor Onset Date/Time:         Dilation Complete Date/Time: 2020 15:35     Start Pushing Date/Time: 2020 15:55       Start Pushing Date/Time: 2020 15:55     Rupture Date/Time:            Rupture type:          Fluid Amount: Moderate      Fluid Color: Clear      Fluid Odor:       Membrane Status: ARM (Artificial Rupture)               steroids: None     Antibiotics given for GBS: No     Induction: oxytocin;amniotomy;balloon dilation ([place])     Indications for induction:        Augmentation:       Indications for augmentation:       Labor complications: None     Additional complications:          Cervical ripening:          Cervidil          Delivery:      Episiotomy: None      Indication for Episiotomy:       Perineal Lacerations: 2nd Repaired:  Yes   Periurethral Laceration:   Repaired:     Labial Laceration:   Repaired:     Sulcus Laceration:   Repaired:     Vaginal Laceration: Yes Repaired: Yes   Cervical Laceration:   Repaired:     Repair suture:       Repair # of packets: 3     Last Value - EBL - Nursing (mL): 450     Sum - EBL - Nursing (mL): 450     Last Value - EBL - Anesthesia (mL):      Calculated QBL (mL):       Vaginal Sweep Performed: Yes     Surgicount Correct: Yes       Other providers:       Anesthesia    Method: Epidural          Details (if applicable):  Trial of Labor      Categorization:      Priority:     Indications for :     Incision Type:       Additional  information:  Forceps:    Vacuum:    Breech:    Observed anomalies    Other (Comments): 1648 Cytotec 800 given orally

## 2020-07-18 NOTE — PROGRESS NOTES
[facility]  Obstetrics  Labor Progress Note    Patient Name: Naseem Moura  MRN: [id number]  Admission Date: 2020  Hospital Length of Stay: 2 days  Attending Physician: Veronica Rios MD  Primary Care Provider: Primary Doctor No    Subjective:     Principal Problem:Abnormal fetal ultrasound    Hospital Course:  Admit for IOL.  Cooks balloon not available on unit - will begin IOL with Cervidil.  R/B/A discussed with patient - patient wishes to continue.  JOAO upon pt request.  Continue to monitor.  20 0745 resting after sedation, Cat 1 tracing, pitocin infusing  20: AROM of clear fluid, pitocin infusing, epidural in place    Interval History:  Naseem is a 29 y.o.  at 38w2d. She is doing well. Epidural in place and comfortable.     Objective:     Vital Signs (Most Recent):  Temp: 98.1 °F (36.7 °C) (20 0711)  Pulse: 88 (20 0815)  Resp: 18 (20)  BP: 122/68 (20 0806)  SpO2: 98 % (20 0815) Vital Signs (24h Range):  Temp:  [98.1 °F (36.7 °C)-98.6 °F (37 °C)] 98.1 °F (36.7 °C)  Pulse:  [] 88  Resp:  [18] 18  SpO2:  [93 %-100 %] 98 %  BP: ()/() 122/68        There is no height or weight on file to calculate BMI.    FHT: Cat 1 (reassuring) accels present, no decels  TOCO:  Q 2-4 minutes    Cervical Exam:  Dilation:  5  Effacement:  70%  Station: -2  Presentation: Vertex     AROM moderate amount of clear, odorless fluid.      Significant Labs:  Lab Results   Component Value Date    GROUPTRH O POS 2020    HEPBSAG Negative 2019    STREPBCULT No Group B Streptococcus isolated 2020       I have personallly reviewed all pertinent lab results from the last 24 hours.    Physical Exam:   Constitutional: She is oriented to person, place, and time. She appears well-developed and well-nourished.    HENT:   Head: Normocephalic and atraumatic.    Eyes: Conjunctivae and EOM are normal.    Neck: Normal range of motion.     Cardiovascular: Normal rate.     Pulmonary/Chest: Effort normal. No respiratory distress.        Abdominal: Soft. She exhibits no distension. There is no abdominal tenderness.     Genitourinary:    Vagina and uterus normal.             Musculoskeletal: Normal range of motion and moves all extremeties.       Neurological: She is alert and oriented to person, place, and time.    Skin: Skin is warm and dry.    Psychiatric: She has a normal mood and affect. Her behavior is normal. Judgment and thought content normal.       Assessment/Plan:     29 y.o. female  at 38w2d for:    * Abnormal fetal ultrasound  Pitocin induction of labor  BPP  in office yesterday    Encounter for induction of labor  SVE /-2  AROM clear fluid  Pitocin infusing  Continue to monitor maternal/fetal status closely    H/O fetal biophysical profile  Admitted for IOL.  Continue to monitor.          Moon Nj CNM  Obstetrics  [facility]

## 2020-07-18 NOTE — PROGRESS NOTES
S: comfortable after epidural placed  O:  VS reviewed, afebrile   Vitals:    07/18/20 0105 07/18/20 0106 07/18/20 0135 07/18/20 0205   BP:  137/68 129/62 132/81   Pulse: 83 82 85 83   Resp:       Temp:       TempSrc:       SpO2: 97%  100% 98%       FHTs Cat 2 no decels, no accels at this time, moderate variability  UC tocos adjusted  SVE cooks catheter out, 6/80/v/-2 baby's head not applied to cervix well    A: IUP @ 38w2d ;     Patient Active Problem List   Diagnosis    Cervical high risk HPV (human papillomavirus) test positive    Pyelectasis of fetus on prenatal ultrasound    Obesity in pregnancy    Abnormal fetal ultrasound    Abnormal [place] glucose challenge test, antepartum    Decreased fetal movement    Amniotic fluid index borderline low    H/O fetal biophysical profile       P:   Continue close monitoring of maternal/fetal status, D5LR bolus, increase pitocin only on 6 mu/min

## 2020-07-18 NOTE — PROGRESS NOTES
07/18/20 0017   TeleStork [facility] Note - Strip   Strip Reviewed by [place] Nurse? Yes   TeleStork [facility] Note - Communication   [place] Nurse Communicated with Bedside Nurse Regarding: Fetal Status  (FHT's not tracing)   TeleStork [facility] Note - Notification   Nurse Notified? Yes  (Nurse at  adjusting monitor)   Name of Nurse Selina

## 2020-07-18 NOTE — ANESTHESIA PROCEDURE NOTES
Epidural    Patient location during procedure: OB   Reason for block: primary anesthetic   Diagnosis: active labor     Staffing  Performing Provider: Amy Holstein, CRNA  Authorizing Provider: Chang Dial MD        Preanesthetic Checklist  Completed: patient identified, site marked, surgical consent, pre-op evaluation, timeout performed, IV checked, risks and benefits discussed, monitors and equipment checked, anesthesia consent given, hand hygiene performed and patient being monitored  Preparation  Patient position: sitting  Prep: Betadine  Patient monitoring: Blood Pressure and Pulse Ox  Epidural  Skin Anesthetic: lidocaine 1%  Skin Wheal: 3 mL  Administration type: continuous  Approach: midline  Interspace: L3-4    Injection technique: ANNMARIE saline  Needle and Epidural Catheter  Needle type: Tuohy   Needle gauge: 17  Needle length: 5.0 inches  Needle insertion depth: 6 cm  Catheter type: Skipjump  Catheter size: 18 G  Catheter at skin depth: 14 cm  Test dose: 5 mL and 3 mL of lidocaine 1.5% with Epi 1-to-200,000  Additional Documentation: incremental injection, negative aspiration for heme and CSF, no paresthesia on injection, no significant pain on injection and no significant complaints from patient  Needle localization: anatomical landmarks  Assessment  Upper dermatomal levels - Left: T10  Right: T10  Ease of block: easy  Patient's tolerance of the procedure: comfortable throughout block and no complaintsNo inadvertent dural puncture with Tuohy.  Dural puncture not performed with spinal needle

## 2020-07-18 NOTE — SUBJECTIVE & OBJECTIVE
Interval History:  Naseem is a 29 y.o.  at 38w2d. She is doing well.     Objective:     Vital Signs (Most Recent):  Temp: 98.4 °F (36.9 °C) (20 1238)  Pulse: 86 (20 1310)  Resp: 18 (20 1952)  BP: 123/79 (20 1306)  SpO2: 100 % (20 1310) Vital Signs (24h Range):  Temp:  [98.1 °F (36.7 °C)-98.6 °F (37 °C)] 98.4 °F (36.9 °C)  Pulse:  [] 86  Resp:  [18] 18  SpO2:  [93 %-100 %] 100 %  BP: (104-162)/() 123/79        There is no height or weight on file to calculate BMI.    FHT: Cat 1 (reassuring)  TOCO:  Q 2-3 minutes, IUPC in place, MVU's adequate     Cervical Exam:  Dilation:  7-8 cm per Rn exam     Significant Labs:  Lab Results   Component Value Date    GROUPTRH O POS 2020    HEPBSAG Negative 2019    STREPBCULT No Group B Streptococcus isolated 2020       I have personallly reviewed all pertinent lab results from the last 24 hours.    Physical Exam

## 2020-07-18 NOTE — PROGRESS NOTES
[facility]  Obstetrics  Labor Progress Note    Patient Name: Naseem Moura  MRN: [id number]  Admission Date: 2020  Hospital Length of Stay: 2 days  Attending Physician: Veronica Rios MD  Primary Care Provider: Primary Doctor No    Subjective:     Principal Problem:Abnormal fetal ultrasound    Hospital Course:  Admit for IOL.  Cooks balloon not available on unit - will begin IOL with Cervidil.  R/B/A discussed with patient - patient wishes to continue.  JOAO upon pt request.  Continue to monitor.  20 0745 resting after sedation, Cat 1 tracing, pitocin infusing  20: AROM of clear fluid, pitocin infusing, epidural in place    Interval History:  Naseem is a 29 y.o.  at 38w2d. She is doing well.     Objective:     Vital Signs (Most Recent):  Temp: 98.4 °F (36.9 °C) (20 1238)  Pulse: 86 (20 1310)  Resp: 18 (20 1952)  BP: 123/79 (20 1306)  SpO2: 100 % (20 1310) Vital Signs (24h Range):  Temp:  [98.1 °F (36.7 °C)-98.6 °F (37 °C)] 98.4 °F (36.9 °C)  Pulse:  [] 86  Resp:  [18] 18  SpO2:  [93 %-100 %] 100 %  BP: (104-162)/() 123/79        There is no height or weight on file to calculate BMI.    FHT: Cat 1 (reassuring)  TOCO:  Q 2-3 minutes, IUPC in place, MVU's adequate     Cervical Exam:  Dilation:  7-8 cm per Rn exam     Significant Labs:  Lab Results   Component Value Date    GROUPTRH O POS 2020    HEPBSAG Negative 2019    STREPBCULT No Group B Streptococcus isolated 2020       I have personallly reviewed all pertinent lab results from the last 24 hours.    Physical Exam    Assessment/Plan:     29 y.o. female  at 38w2d for:    * Abnormal fetal ultrasound  Pitocin induction of labor  BPP 4/8 in office yesterday    Encounter for induction of labor  SVE 7-8cm  Progressing  Pitocin infusing  Continue to monitor maternal/fetal status closely  Anticipate     H/O fetal biophysical profile  Admitted for  IOL.  Continue to monitor.          Moon Nj CNM  Obstetrics  [facility]

## 2020-07-18 NOTE — LACTATION NOTE
Lactation Rounds:     Visited mother at bedside. Per transition, mother wishes to breast and formula feed, and infant received a bottle of formula for first feeding due to maternal fever. Offered mother assistance with breastfeeding when infant next shows cues. Mother stated that she would like to try to put infant to breast at next feeding time. Plan to do admit teaching at that visit.

## 2020-07-18 NOTE — ANESTHESIA POSTPROCEDURE EVALUATION
Anesthesia Post Evaluation    Patient: Naseem Moura    Procedure(s) Performed: * No procedures listed *    Final Anesthesia Type: epidural    Patient location during evaluation: labor & delivery  Patient participation: Yes- Able to Participate  Level of consciousness: awake and alert and awake  Post-procedure vital signs: reviewed and stable  Pain management: adequate  Airway patency: patent    PONV status at discharge: No PONV  Anesthetic complications: no      Cardiovascular status: blood pressure returned to baseline and hemodynamically stable  Respiratory status: unassisted and spontaneous ventilation  Hydration status: euvolemic  Follow-up not needed.          Vitals Value Taken Time   /46 07/18/20 1751   Temp 39.4 °C (102.9 °F) 07/18/20 1750   Pulse 136 07/18/20 1755   Resp 18 07/17/20 1952   SpO2 98 % 07/18/20 1755         No case tracking events are documented in the log.      Pain/Aryan Score: Pain Rating Prior to Med Admin: 0 (7/18/2020  5:57 PM)  Pain Rating Post Med Admin: 3 (7/17/2020 10:00 AM)

## 2020-07-18 NOTE — PROGRESS NOTES
07/17/20 7090   TeleStork [facility] Note - Strip   Strip Reviewed by [place] Nurse? Yes   TeleStork [facility] Note - Communication   [place] Nurse Communicated with Bedside Nurse Regarding: Fetal Status  (Not tracing)   TeleStork [place] Note - Notification   Nurse Notified? Yes  (Nurse at BS)   Name of Nurse Marilee

## 2020-07-18 NOTE — ANESTHESIA PREPROCEDURE EVALUATION
2020  Naseem Moura is a 29 y.o., female.    Anesthesia Evaluation    I have reviewed the Patient Summary Reports.      I have reviewed the Medications.     Review of Systems  Anesthesia Hx:  No previous Anesthesia  Denies Family Hx of Anesthesia complications.   Denies Personal Hx of Anesthesia complications.   Social:  Non-Smoker, No Alcohol Use    Hematology/Oncology:  Hematology Normal   Oncology Normal     EENT/Dental:EENT/Dental Normal   Cardiovascular:  Cardiovascular Normal     Pulmonary:  Pulmonary Normal    Renal/:  Renal/ Normal     Hepatic/GI:  Hepatic/GI Normal    Musculoskeletal:  Musculoskeletal Normal    OB/GYN/PEDS:  Planned Vaginal Delivery   1  , Para 0  Denies Issues with Current Pregnancy  Neuraxial Anesthesia - Previous History    Neurological:  Neurology Normal    Endocrine:  Endocrine Normal    Dermatological:  Skin Normal    Psych:  Psychiatric Normal           Physical Exam  General:  Well nourished    Airway/Jaw/Neck:  Airway Findings: Mouth Opening: Normal Tongue: Normal  General Airway Assessment: Adult  Mallampati: II  TM Distance: 4 - 6 cm  Jaw/Neck Findings:  Neck ROM: Normal ROM      Dental:  Dental Findings: In tact   Chest/Lungs:  Chest/Lungs Findings: Clear to auscultation, Normal Respiratory Rate     Heart/Vascular:  Heart Findings: Rate: Normal  Rhythm: Regular Rhythm  Sounds: Normal        Mental Status:  Mental Status Findings:  Cooperative, Alert and Oriented         Anesthesia Plan  Type of Anesthesia, risks & benefits discussed:  Anesthesia Type:  epidural  Patient's Preference:   Intra-op Monitoring Plan: standard ASA monitors  Intra-op Monitoring Plan Comments:   Post Op Pain Control Plan: per primary service following discharge from PACU  Post Op Pain Control Plan Comments:   Induction:    Beta Blocker:  Patient is not currently on a  Beta-Blocker (No further documentation required).       Informed Consent: Patient understands risks and agrees with Anesthesia plan.  Questions answered. Anesthesia consent signed with patient.  ASA Score: 2     Day of Surgery Review of History & Physical: I have interviewed and examined the patient. I have reviewed the patient's H&P dated:            Ready For Surgery From Anesthesia Perspective.

## 2020-07-18 NOTE — ASSESSMENT & PLAN NOTE
SVE 5/70/-2  AROM clear fluid  Pitocin infusing  Continue to monitor maternal/fetal status closely

## 2020-07-18 NOTE — PROGRESS NOTES
S: resting, enjoyed her shower and supper  O:  VS reviewed, afebrile   Vitals:    07/17/20 1952 07/17/20 2005 07/17/20 2035 07/17/20 2105   BP: 135/82 131/74 135/82 127/82   Pulse: 101 91 89 79   Resp: 18      Temp: 98.5 °F (36.9 °C)      TempSrc: Oral      SpO2:  95% (!) 94% (!) 94%       FHTs Cat 1 tracing  UC rare UC  SVE 2/80/v/-1  Cooks catheter inserted, 80 cc X 2    A: IUP @ 38w1d ;     Patient Active Problem List   Diagnosis    Cervical high risk HPV (human papillomavirus) test positive    Pyelectasis of fetus on prenatal ultrasound    Obesity in pregnancy    Abnormal fetal ultrasound    Abnormal [place] glucose challenge test, antepartum    Decreased fetal movement    Amniotic fluid index borderline low    H/O fetal biophysical profile       P:   Continue close monitoring of maternal/fetal status, will start pitocin

## 2020-07-18 NOTE — PROGRESS NOTES
Difficulty tracing contractions due to maternal habitus. Mother consents to IUPC placement. IUPC placed with easy. Will continue to monitor maternal/fetal status closely.

## 2020-07-18 NOTE — PLAN OF CARE
IOL for BPP 4/8 at 38.2 weeks, cervix 6/80/-2 at last exam. Epidural in place, pitocin infusing. Pt. Doing well, will continue to monitor.

## 2020-07-18 NOTE — ASSESSMENT & PLAN NOTE
SVE 7-8cm  Progressing  Pitocin infusing  Continue to monitor maternal/fetal status closely  Anticipate

## 2020-07-18 NOTE — SUBJECTIVE & OBJECTIVE
Interval History:  Naseem is a 29 y.o.  at 38w2d. She is doing well. Epidural in place and comfortable.     Objective:     Vital Signs (Most Recent):  Temp: 98.1 °F (36.7 °C) (20 0711)  Pulse: 88 (20 0815)  Resp: 18 (20)  BP: 122/68 (20 0806)  SpO2: 98 % (20 0815) Vital Signs (24h Range):  Temp:  [98.1 °F (36.7 °C)-98.6 °F (37 °C)] 98.1 °F (36.7 °C)  Pulse:  [] 88  Resp:  [18] 18  SpO2:  [93 %-100 %] 98 %  BP: ()/() 122/68        There is no height or weight on file to calculate BMI.    FHT: Cat 1 (reassuring) accels present, no decels  TOCO:  Q 2-4 minutes    Cervical Exam:  Dilation:  5  Effacement:  70%  Station: -2  Presentation: Vertex     AROM moderate amount of clear, odorless fluid.      Significant Labs:  Lab Results   Component Value Date    GROUPTRH O POS 2020    HEPBSAG Negative 2019    STREPBCULT No Group B Streptococcus isolated 2020       I have personallly reviewed all pertinent lab results from the last 24 hours.    Physical Exam:   Constitutional: She is oriented to person, place, and time. She appears well-developed and well-nourished.    HENT:   Head: Normocephalic and atraumatic.    Eyes: Conjunctivae and EOM are normal.    Neck: Normal range of motion.    Cardiovascular: Normal rate.     Pulmonary/Chest: Effort normal. No respiratory distress.        Abdominal: Soft. She exhibits no distension. There is no abdominal tenderness.     Genitourinary:    Vagina and uterus normal.             Musculoskeletal: Normal range of motion and moves all extremeties.       Neurological: She is alert and oriented to person, place, and time.    Skin: Skin is warm and dry.    Psychiatric: She has a normal mood and affect. Her behavior is normal. Judgment and thought content normal.

## 2020-07-19 PROBLEM — O99.810 ABNORMAL O'SULLIVAN GLUCOSE CHALLENGE TEST, ANTEPARTUM: Status: RESOLVED | Noted: 2020-05-13 | Resolved: 2020-07-19

## 2020-07-19 PROBLEM — O28.8 AMNIOTIC FLUID INDEX BORDERLINE LOW: Status: RESOLVED | Noted: 2020-07-15 | Resolved: 2020-07-19

## 2020-07-19 PROBLEM — O36.8190 DECREASED FETAL MOVEMENT: Status: RESOLVED | Noted: 2020-07-13 | Resolved: 2020-07-19

## 2020-07-19 PROBLEM — O35.EXX0 PYELECTASIS OF FETUS ON PRENATAL ULTRASOUND: Status: RESOLVED | Noted: 2020-03-18 | Resolved: 2020-07-19

## 2020-07-19 PROBLEM — O99.210 OBESITY IN PREGNANCY: Status: RESOLVED | Noted: 2020-03-18 | Resolved: 2020-07-19

## 2020-07-19 PROCEDURE — 63600175 PHARM REV CODE 636 W HCPCS: Performed by: MIDWIFE

## 2020-07-19 PROCEDURE — 99231 PR SUBSEQUENT HOSPITAL CARE,LEVL I: ICD-10-PCS | Mod: ,,, | Performed by: MIDWIFE

## 2020-07-19 PROCEDURE — 99231 SBSQ HOSP IP/OBS SF/LOW 25: CPT | Mod: ,,, | Performed by: MIDWIFE

## 2020-07-19 PROCEDURE — 25000003 PHARM REV CODE 250: Performed by: MIDWIFE

## 2020-07-19 PROCEDURE — 11000001 HC ACUTE MED/SURG PRIVATE ROOM

## 2020-07-19 RX ADMIN — IBUPROFEN 600 MG: 600 TABLET, FILM COATED ORAL at 05:07

## 2020-07-19 RX ADMIN — DOCUSATE SODIUM 200 MG: 100 CAPSULE, LIQUID FILLED ORAL at 09:07

## 2020-07-19 RX ADMIN — IBUPROFEN 600 MG: 600 TABLET, FILM COATED ORAL at 12:07

## 2020-07-19 RX ADMIN — HYDROCORTISONE: 25 CREAM TOPICAL at 09:07

## 2020-07-19 RX ADMIN — ENOXAPARIN SODIUM 40 MG: 100 INJECTION SUBCUTANEOUS at 05:07

## 2020-07-19 RX ADMIN — IBUPROFEN 600 MG: 600 TABLET, FILM COATED ORAL at 11:07

## 2020-07-19 RX ADMIN — HYDROCODONE BITARTRATE AND ACETAMINOPHEN 1 TABLET: 5; 325 TABLET ORAL at 07:07

## 2020-07-19 NOTE — PLAN OF CARE
at 1641, 2nd lac. Repaired. Mildly elevated temp. And HR, CNM aware. Awaiting void. Pt. Bonding with infant. Will continue to monitor.

## 2020-07-19 NOTE — NURSING
MOTHER BABY DC TEACHING REVIEWED ,  QUESTIONS ANSWERED ,   PT  HAS DECIDED TO  USE DR CORNELIUS AT THE  [facility] PED OFFICE FOR  HER PED

## 2020-07-19 NOTE — SUBJECTIVE & OBJECTIVE
Interval History:     She is doing well this morning. She is tolerating a regular diet without nausea or vomiting. She is voiding spontaneously. She is ambulating. She has passed flatus, and has not a BM. Vaginal bleeding is mild. She denies fever or chills. Abdominal pain is mild and controlled with oral medications. She is breastfeeding. She desires circumcision for her male baby: no.    Objective:     Vital Signs (Most Recent):  Temp: 98.2 °F (36.8 °C) (07/19/20 0717)  Pulse: 83 (07/19/20 0717)  Resp: 20 (07/19/20 0717)  BP: (!) 97/56 (07/19/20 0717)  SpO2: 97 % (07/18/20 1955) Vital Signs (24h Range):  Temp:  [97.8 °F (36.6 °C)-102.9 °F (39.4 °C)] 98.2 °F (36.8 °C)  Pulse:  [] 83  Resp:  [18-20] 20  SpO2:  [96 %-100 %] 97 %  BP: ()/(44-99) 97/56        There is no height or weight on file to calculate BMI.      Intake/Output Summary (Last 24 hours) at 7/19/2020 0837  Last data filed at 7/19/2020 0700  Gross per 24 hour   Intake 4515.08 ml   Output 1950 ml   Net 2565.08 ml           Significant Labs:  Lab Results   Component Value Date    GROUPTRH O POS 07/16/2020    HEPBSAG Negative 12/16/2019    STREPBCULT No Group B Streptococcus isolated 07/09/2020     No results for input(s): HGB, HCT in the last 48 hours.    I have personallly reviewed all pertinent lab results from the last 24 hours.  Recent Lab Results       07/18/20  1930        Anion Gap 10     BUN, Bld 7     Calcium 8.8     Chloride 106     CO2 16     Creatinine 0.7     eGFR if  >60     eGFR if non  >60  Comment:  Calculation used to obtain the estimated glomerular filtration  rate (eGFR) is the CKD-EPI equation.        Glucose 130     Potassium 3.9     Sodium 132           Physical Exam:   Constitutional: She is oriented to person, place, and time. She appears well-developed and well-nourished.    HENT:   Head: Normocephalic.     Neck: Normal range of motion. Neck supple.    Cardiovascular: Normal rate.      Pulmonary/Chest: Effort normal.        Abdominal: Soft.   obese     Genitourinary:    Genitourinary Comments: lochia small/mod per pt             Musculoskeletal: Normal range of motion and moves all extremeties.       Neurological: She is alert and oriented to person, place, and time.    Skin: Skin is warm and dry.    Psychiatric: She has a normal mood and affect. Her behavior is normal. Judgment and thought content normal.

## 2020-07-19 NOTE — DISCHARGE INSTRUCTIONS
"YOU MAY GET HELP AFTER DISCHARGE !!    CALL LACTATION WARM LINE [phone]    CALL LABOR AND DELIVERY 24 HR A DAY OR NIGHT [phone]    CALL THE MIDWIFE OR THE OB GYN DOCTOR AT OFFICE     CALL YOU [facility] OFFICE    CALL THE BABY DOCTORS- [facility] AT THE [facility]  [phone]          Mother Self Care:    Activity: Avoid strenuous exercise and get adequate rest.  No driving until the physician consent given.  Emotional Changes: Most women find birth to be a time of great emotional upheaval.  Sense of loss, mood swings, fatigue, anxiety, and feeling "let down" are common.  If feelings worsen or last more than a week, call your physician.  Breast Care/Breastfeeding: Wear a bra for comfort.  Keep nipples dry and apply your own breast milk or lanolin cream as needed for soreness.  Engorgement can be relieved with warm, moist heat before feedings.  You may also take Ibuprofen.  Breast Care/Bottle Feeding: Wear support bra 24 hours a day for one week.  Avoid stimulation to breasts.  You may use ice packs for discomfort.  Lizet-Care/Vaginal Bleeding: Remember to use your lizet-bottle after urinating.  Your flow will change from red, to pink, to yellow/white color over a period of 2 weeks.  Menstruation will return in 3-8 weeks, or longer if breastfeeding.  Episiotomy Vaginal Delivery: Stitches will dissolve within 10 days to 3 weeks.  Warm baths, tucks, and dermoplast will promote healing.  Avoid bubble baths or strong soaps.   Section/Tubal Ligation: Keep incision clean and dry. You may shower, but avoid baths.  Sexual Activity/Pelvic Rest: No sexual activity, tampons, or douching until your physician gives you consent.  Diet: Continue to eat from the five basic food groups, including plenty of protein, fruits, vegetables, and whole grains.  Limit empty calories and high fat foods.  Drink enough fluids to satisfy thirst and add an extra 500 calories for breastfeeding.  Constipation/Hemorrhoids: Drink plenty of water.  " You may take a stool softener or natural laxative (Metamucil). You may use tucks or hemorrhoid ointment and soak in a warm tub.    CALL YOUR OB DOCTOR IF ANY OF THE FOLLOWING OCCURS:  *Heavy bleeding - saturating a pad an hour or passing any large (2-3 inches in size) blood clots.  *Any pain, redness, or tenderness in lower leg.  *You cannot care for yourself or your baby.  *Any signs of infection-      - Temperature greater than 100.5 degrees F      - Foul smelling vaginal discharge and/or incisional drainage      - Increased episiotomy or incisional pain      - Hot, hard, red or sore area on breast      - Flu-like symptoms      - Any urgency, frequency or burning with urination    Return To the Hospital for further Evaluation:  · Headache not relieved by tylenol or ibuprofen  · Blurry vision, double vision, seeing spots, or flashing lights  · Feeling faint or passing out  · Right epigastric pain  · Difficulty breathing  · Swelling in hands, face, or feet  · Any of these symptoms accompanied by nausea/vomiting  · Gaining more than 5 pounds in one week  · Seizures  These symptoms could be an indication of elevated blood pressure.       If you have any questions that need to be answered immediately please call the Labor & Delivery Unit at [phone] and ask to speak to a nurse.

## 2020-07-19 NOTE — LACTATION NOTE
"Lactation Rounds:     Visited mother at bedside. Admit teaching done, including feeding on demand, recognition of feeding cues, expectations for infant feeding/behavior in first day of life, importance of skin to skin contact, hand expression, risks of non-medically indicated formula supplementation. Mother intends to breastfeed and formula feed; discussed mechanisms of milk production.     Mother was attempting to latch infant to left breast in cradle hold at start of encounter. Infant was rooting with wide gape but was not able to maintain latch in this hold. Assisted mother to reposition infant to left cross-cradle hold, but she had some difficulty returning demonstration in this position. Hold changed again to right football and latch attempts made. Hand expression was demonstrated to patient at this time with her permission.     Infant was able to grasp nipple and suckle in short bursts, but he released the breast frequently and became sleepy after a couple of attempts. Mother requested formula for infant, stating that she felt that he needed "a lot of milk," in particular because he took a full bottle at first feeding. Discussed adequacy of colostrum with deep latch and effective breastfeeding. Recommended attempting to latch infant prior to offering any formula supplementation. [employer] "Attaching Your Baby at the Breast" video in Greek provided for reinforcement of education.     Formula brought to bedside. Discussed alternative feeding methods and risks of use of artificial nipples. Mother chose to offer formula via bottle nipple. She was shown how to feed her baby with paced bottle feeding. Infant took 25 mL of formula and fell asleep. Demonstrated burping of infant and diaper change to parents, who returned demonstration. Lactation phone number was provided with encouragement to call as needed.     "

## 2020-07-19 NOTE — NURSING
"Pt. Up to bathroom, unable to void at this time. Bleeding light, will try again later. Pt. Stated she felt "a little weird" when ambulating back to bed but did well. Will let mother/baby nurse know to assist pt. To bathroom a second time.   "

## 2020-07-19 NOTE — PROGRESS NOTES
[facility] -   Obstetrics  Postpartum Progress Note    Patient Name: Naseem Moura  MRN: [id number]  Admission Date: 2020  Hospital Length of Stay: 3 days  Attending Physician: Eugenio Donahue MD  Primary Care Provider: Primary Doctor No    Subjective:     Principal Problem: (spontaneous vaginal delivery)    Hospital Course:  Admit for IOL.  Cooks balloon not available on unit - will begin IOL with Cervidil.  R/B/A discussed with patient - patient wishes to continue.  JOAO upon pt request.  Continue to monitor.  20 0745 resting after sedation, Cat 1 tracing, pitocin infusing  20: AROM of clear fluid, pitocin infusing, epidural in place  20--PPD #1, normal PP course, d/c home tomorrow    Interval History:     She is doing well this morning. She is tolerating a regular diet without nausea or vomiting. She is voiding spontaneously. She is ambulating. She has passed flatus, and has not a BM. Vaginal bleeding is mild. She denies fever or chills. Abdominal pain is mild and controlled with oral medications. She is breastfeeding. She desires circumcision for her male baby: no.    Objective:     Vital Signs (Most Recent):  Temp: 98.2 °F (36.8 °C) (20)  Pulse: 83 (20)  Resp: 20 (20)  BP: (!) 97/56 (20)  SpO2: 97 % (20) Vital Signs (24h Range):  Temp:  [97.8 °F (36.6 °C)-102.9 °F (39.4 °C)] 98.2 °F (36.8 °C)  Pulse:  [] 83  Resp:  [18-20] 20  SpO2:  [96 %-100 %] 97 %  BP: ()/(44-99) 97/56        There is no height or weight on file to calculate BMI.      Intake/Output Summary (Last 24 hours) at 2020 0837  Last data filed at 2020 0700  Gross per 24 hour   Intake 4515.08 ml   Output 1950 ml   Net 2565.08 ml           Significant Labs:  Lab Results   Component Value Date    GROUPTRH O POS 2020    HEPBSAG Negative 2019    STREPBCULT No Group B Streptococcus isolated 2020     No results for  input(s): HGB, HCT in the last 48 hours.    I have personallly reviewed all pertinent lab results from the last 24 hours.  Recent Lab Results       20  1930        Anion Gap 10     BUN, Bld 7     Calcium 8.8     Chloride 106     CO2 16     Creatinine 0.7     eGFR if  >60     eGFR if non  >60  Comment:  Calculation used to obtain the estimated glomerular filtration  rate (eGFR) is the CKD-EPI equation.        Glucose 130     Potassium 3.9     Sodium 132           Physical Exam:   Constitutional: She is oriented to person, place, and time. She appears well-developed and well-nourished.    HENT:   Head: Normocephalic.     Neck: Normal range of motion. Neck supple.    Cardiovascular: Normal rate.     Pulmonary/Chest: Effort normal.        Abdominal: Soft.   obese     Genitourinary:    Genitourinary Comments: lochia small/mod per pt             Musculoskeletal: Normal range of motion and moves all extremeties.       Neurological: She is alert and oriented to person, place, and time.    Skin: Skin is warm and dry.    Psychiatric: She has a normal mood and affect. Her behavior is normal. Judgment and thought content normal.       Assessment/Plan:     29 y.o. female  for:    *  (spontaneous vaginal delivery)  Routine PP care    Obstetric vaginal laceration with second degree perineal laceration  Routine Perineal care    Single live birth  Care of infant per peds    H/O fetal biophysical profile  Admitted for IOL.  Continue to monitor.    Abnormal fetal ultrasound  Pitocin induction of labor  BPP 4/8 in office yesterday        Disposition: As patient meets milestones, will plan to discharge tomorrow.    Kilo Katz CNM  Obstetrics  [facility] -

## 2020-07-19 NOTE — LACTATION NOTE
Lactation Rounds.     Mother reclined in bed, father at the bedside preparing a bottle of formula for infant.  Mother reports that she would like to breastfeed but believes that she does not have enough milk for her baby.  She states she is concerned that colostrum is not adequate and she is following each feeding at the breast with a bottle of formula. Mother reports that she feels confident attaching baby to breast and state the latch is comfortable.    Discussed milk production and the importance of offering the breast early and often.  Discussed how supplemental formula can delay feeding cues and attempts at the breast and how this can decrease milk supply.    Ongoing education provided including correct positioning and latch, signs of an effective feeding, early feeding cues and baby-led feeds, frequency of feeds including the normality of cluster feeding, hand expression, exclusive breastfeeding for 6 months, as well as when and  how to seek the assistance of a qualified health care professional for concerns related to  feeding.     Lactation contact information given and mother encouraged to call for any questions, concerns, or assistance as desired.

## 2020-07-20 VITALS
OXYGEN SATURATION: 97 % | RESPIRATION RATE: 18 BRPM | HEART RATE: 87 BPM | SYSTOLIC BLOOD PRESSURE: 104 MMHG | DIASTOLIC BLOOD PRESSURE: 57 MMHG | TEMPERATURE: 98 F

## 2020-07-20 PROCEDURE — 63600175 PHARM REV CODE 636 W HCPCS: Performed by: MIDWIFE

## 2020-07-20 PROCEDURE — 25000003 PHARM REV CODE 250: Performed by: MIDWIFE

## 2020-07-20 PROCEDURE — 99238 HOSP IP/OBS DSCHRG MGMT 30/<: CPT | Mod: ,,, | Performed by: MIDWIFE

## 2020-07-20 PROCEDURE — 99238 PR HOSPITAL DISCHARGE DAY,<30 MIN: ICD-10-PCS | Mod: ,,, | Performed by: MIDWIFE

## 2020-07-20 RX ORDER — IBUPROFEN 600 MG/1
600 TABLET ORAL EVERY 6 HOURS PRN
Qty: 30 TABLET | Refills: 0 | Status: SHIPPED | OUTPATIENT
Start: 2020-07-20 | End: 2020-08-20

## 2020-07-20 RX ADMIN — IBUPROFEN 600 MG: 600 TABLET, FILM COATED ORAL at 05:07

## 2020-07-20 RX ADMIN — ENOXAPARIN SODIUM 40 MG: 100 INJECTION SUBCUTANEOUS at 05:07

## 2020-07-20 NOTE — SUBJECTIVE & OBJECTIVE
Interval History: PPD2    She is doing well this morning. She is tolerating a regular diet without nausea or vomiting. She is voiding spontaneously. She is ambulating. She has passed flatus. Vaginal bleeding is moderate. She denies fever or chills. Abdominal pain is mild and controlled with oral medications. She is breastfeeding and formula. She desires circumcision for her male baby: no.    Objective:     Vital Signs (Most Recent):  Temp: 97.9 °F (36.6 °C) (07/20/20 0827)  Pulse: 87 (07/20/20 0827)  Resp: 18 (07/20/20 0827)  BP: (!) 104/57 (07/20/20 0827)  SpO2: 97 % (07/18/20 1955) Vital Signs (24h Range):  Temp:  [97.9 °F (36.6 °C)-98.1 °F (36.7 °C)] 97.9 °F (36.6 °C)  Pulse:  [84-91] 87  Resp:  [16-20] 18  BP: ()/(50-57) 104/57        There is no height or weight on file to calculate BMI.    No intake or output data in the 24 hours ending 07/20/20 0946        Significant Labs:  Lab Results   Component Value Date    GROUPTRH O POS 07/16/2020    HEPBSAG Negative 12/16/2019    STREPBCULT No Group B Streptococcus isolated 07/09/2020     No results for input(s): HGB, HCT in the last 48 hours.    I have personallly reviewed all pertinent lab results from the last 24 hours.    Physical Exam:   Constitutional: She is oriented to person, place, and time. She appears well-developed and well-nourished.    HENT:   Head: Normocephalic and atraumatic.    Eyes: Conjunctivae and EOM are normal.    Neck: Normal range of motion.    Cardiovascular: Normal rate, regular rhythm and normal heart sounds.  Exam reveals edema.    Mild LE edema    Pulmonary/Chest: Effort normal and breath sounds normal. No respiratory distress.        Abdominal: Soft. Bowel sounds are normal. She exhibits no distension. There is no abdominal tenderness.     Genitourinary:    Vagina and uterus normal.             Musculoskeletal: Normal range of motion and moves all extremeties.       Neurological: She is alert and oriented to person, place, and  time.    Skin: Skin is warm and dry.    Psychiatric: She has a normal mood and affect. Her behavior is normal. Judgment and thought content normal.

## 2020-07-20 NOTE — LACTATION NOTE
Lactation Rounds:     Visited mother at bedside. She anticipates discharge for herself and infant today. Lactation discharge education reviewed with patient, including expectations for feeding and output, first alert form, engorgement/mastitis, diet/medications ([facility]), support groups/warmline, etc. Reviewed individualized patient education based on mother's goals to breastfeed and formula feed. Patient verbalized understanding of education.

## 2020-07-20 NOTE — PROGRESS NOTES
[facility] -   Obstetrics  Postpartum Progress Note    Patient Name: Naseem Moura  MRN: [id number]  Admission Date: 2020  Hospital Length of Stay: 4 days  Attending Physician: Eugenio Donahue MD  Primary Care Provider: Primary Doctor No    Subjective:     Principal Problem: (spontaneous vaginal delivery)    Hospital Course:  Admit for IOL.  Cooks balloon not available on unit - will begin IOL with Cervidil.  R/B/A discussed with patient - patient wishes to continue.  JOAO upon pt request.  Continue to monitor.  20 0745 resting after sedation, Cat 1 tracing, pitocin infusing  20: AROM of clear fluid, pitocin infusing, epidural in place  20--PPD #1, normal PP course, d/c home tomorrow  20: PPD2, doing well, without complaints, d/c to home    Interval History: PPD2    She is doing well this morning. She is tolerating a regular diet without nausea or vomiting. She is voiding spontaneously. She is ambulating. She has passed flatus. Vaginal bleeding is moderate. She denies fever or chills. Abdominal pain is mild and controlled with oral medications. She is breastfeeding and formula. She desires circumcision for her male baby: no.    Objective:     Vital Signs (Most Recent):  Temp: 97.9 °F (36.6 °C) (20)  Pulse: 87 (20)  Resp: 18 (20)  BP: (!) 104/57 (20)  SpO2: 97 % (20) Vital Signs (24h Range):  Temp:  [97.9 °F (36.6 °C)-98.1 °F (36.7 °C)] 97.9 °F (36.6 °C)  Pulse:  [84-91] 87  Resp:  [16-20] 18  BP: ()/(50-57) 104/57        There is no height or weight on file to calculate BMI.    No intake or output data in the 24 hours ending 20 0946        Significant Labs:  Lab Results   Component Value Date    GROUPTRH O POS 2020    HEPBSAG Negative 2019    STREPBCULT No Group B Streptococcus isolated 2020     No results for input(s): HGB, HCT in the last 48 hours.    I have personallly reviewed all  pertinent lab results from the last 24 hours.    Physical Exam:   Constitutional: She is oriented to person, place, and time. She appears well-developed and well-nourished.    HENT:   Head: Normocephalic and atraumatic.    Eyes: Conjunctivae and EOM are normal.    Neck: Normal range of motion.    Cardiovascular: Normal rate, regular rhythm and normal heart sounds.  Exam reveals edema.    Mild LE edema    Pulmonary/Chest: Effort normal and breath sounds normal. No respiratory distress.        Abdominal: Soft. Bowel sounds are normal. She exhibits no distension. There is no abdominal tenderness.     Genitourinary:    Vagina and uterus normal.             Musculoskeletal: Normal range of motion and moves all extremeties.       Neurological: She is alert and oriented to person, place, and time.    Skin: Skin is warm and dry.    Psychiatric: She has a normal mood and affect. Her behavior is normal. Judgment and thought content normal.       Assessment/Plan:     29 y.o. female  for:    *  (spontaneous vaginal delivery)  Routine PP care  D/c to home    Obstetric vaginal laceration with second degree perineal laceration  Routine Perineal care    Single live birth  Care of infant per peds    H/O fetal biophysical profile  Admitted for IOL.  Continue to monitor.    Abnormal fetal ultrasound  Pitocin induction of labor  BPP 4/8 in office yesterday        Disposition: As patient meets milestones, will plan to discharge today.    Moon Nj CNM  Obstetrics  [facility]

## 2020-07-20 NOTE — DISCHARGE SUMMARY
[facility] -   Obstetrics  Discharge Summary      Patient Name: Naseem Moura  MRN: [id number]  Admission Date: 2020  Hospital Length of Stay: 4 days  Discharge Date and Time:  2020 9:49 AM  Attending Physician: Eugenio Donahue MD   Discharging Provider: Moon Nj CNM   Primary Care Provider: Primary Doctor No    HPI: Sent from office due to BPP 4/8 - movement and tone        * No surgery found *     Hospital Course:   Admit for IOL.  Cooks balloon not available on unit - will begin IOL with Cervidil.  R/B/A discussed with patient - patient wishes to continue.  JOAO upon pt request.  Continue to monitor.  20 0745 resting after sedation, Cat 1 tracing, pitocin infusing  20: AROM of clear fluid, pitocin infusing, epidural in place  20--PPD #1, normal PP course, d/c home tomorrow  20: PPD2, doing well, without complaints, d/c to home         Final Active Diagnoses:    Diagnosis Date Noted POA    PRINCIPAL PROBLEM:   (spontaneous vaginal delivery) [O80] 2020 Not Applicable    Single live birth [Z37.0] 2020 Not Applicable    Obstetric vaginal laceration with second degree perineal laceration [O70.1] 2020 No    H/O fetal biophysical profile [Z92.89] 2020 Not Applicable    Abnormal fetal ultrasound [O28.3] 2020 Yes      Problems Resolved During this Admission:    Diagnosis Date Noted Date Resolved POA    Encounter for induction of labor [Z34.90] 2020 Not Applicable        Significant Diagnostic Studies: Labs: CBC No results for input(s): WBC, HGB, HCT, PLT in the last 48 hours.      Feeding Method: both breast and bottle    Immunizations     None          Delivery:    Episiotomy: None   Lacerations: 2nd   Repair suture:     Repair # of packets: 3   Blood loss (ml): 450     Birth information:  YOB: 2022   Time of birth: 4:41 PM   Sex: male   Delivery type: Vaginal, Spontaneous   Gestational Age:  38w2d    Delivery Clinician:      Other providers:       Additional  information:  Forceps:    Vacuum:    Breech:    Observed anomalies      Living?:           APGARS  One minute Five minutes Ten minutes   Skin color:         Heart rate:         Grimace:         Muscle tone:         Breathing:         Totals: 7  9        Placenta: Delivered:       appearance    Pending Diagnostic Studies:     None          Discharged Condition: good    Disposition: Home or Self Care    Follow Up:  Follow-up Information     Kilo Katz CNM In 4 weeks.    Specialty: Obstetrics and Gynecology  Why: PP visit  Contact information:  [address]  [place] [address] [phone]                 Patient Instructions:      Activity as tolerated     Medications:  Current Discharge Medication List      START taking these medications    Details   ibuprofen (ADVIL,MOTRIN) 600 MG tablet Take 1 tablet (600 mg total) by mouth every 6 (six) hours as needed for Pain.  Qty: 30 tablet, Refills: 0         CONTINUE these medications which have NOT CHANGED    Details   -IRON-FOLATE 1-DSS-DHA ORAL Take 1 capsule by mouth Daily.         STOP taking these medications       aspirin (ECOTRIN) 81 MG EC tablet Comments:   Reason for Stopping:               Moon Nj CNM  Obstetrics  [facility]

## 2020-07-23 ENCOUNTER — TELEPHONE (OUTPATIENT)
Dept: LACTATION | Facility: CLINIC | Age: 30
End: 2020-07-23

## 2020-07-29 ENCOUNTER — PATIENT MESSAGE (OUTPATIENT)
Dept: OBSTETRICS AND GYNECOLOGY | Facility: CLINIC | Age: 30
End: 2020-07-29

## 2020-07-30 NOTE — TELEPHONE ENCOUNTER
Spoke with Pt and she will try the advice provided by Melissa. She is not experiencing any fever. She states she will contact us if her issues do not get any better. Pt voiced understanding. DS

## 2020-08-20 ENCOUNTER — POSTPARTUM VISIT (OUTPATIENT)
Dept: OBSTETRICS AND GYNECOLOGY | Facility: CLINIC | Age: 30
End: 2020-08-20
Payer: MEDICAID

## 2020-08-20 VITALS
WEIGHT: 258.81 LBS | BODY MASS INDEX: 45.86 KG/M2 | HEIGHT: 63 IN | DIASTOLIC BLOOD PRESSURE: 74 MMHG | SYSTOLIC BLOOD PRESSURE: 102 MMHG

## 2020-08-20 PROBLEM — Z92.89 H/O FETAL BIOPHYSICAL PROFILE: Status: RESOLVED | Noted: 2020-07-16 | Resolved: 2020-08-20

## 2020-08-20 PROBLEM — O28.3 ABNORMAL FETAL ULTRASOUND: Status: RESOLVED | Noted: 2020-05-12 | Resolved: 2020-08-20

## 2020-08-20 PROCEDURE — 99213 OFFICE O/P EST LOW 20 MIN: CPT | Mod: PBBFAC | Performed by: MIDWIFE

## 2020-08-20 PROCEDURE — 99999 PR PBB SHADOW E&M-EST. PATIENT-LVL III: CPT | Mod: PBBFAC,,, | Performed by: MIDWIFE

## 2020-08-20 PROCEDURE — 59430 PR CARE AFTER DELIVERY ONLY: ICD-10-PCS | Mod: ,,, | Performed by: MIDWIFE

## 2020-08-20 PROCEDURE — 99999 PR PBB SHADOW E&M-EST. PATIENT-LVL III: ICD-10-PCS | Mod: PBBFAC,,, | Performed by: MIDWIFE

## 2020-08-20 NOTE — PROGRESS NOTES
"Subjective:       Naseem Moura is a 29 y.o. female who presents for a postpartum visit. She is 4 weeks postpartum following a spontaneous vaginal delivery. I have fully reviewed the prenatal and intrapartum course. The delivery was at 38 5/7 gestational weeks. Outcome: spontaneous vaginal delivery. Anesthesia: epidural. Postpartum course has been unremarkable. Baby's course has been unremarkable. Baby is feeding by both breast and bottle - Similac Advance. Bleeding staining only. Bowel function is normal. Bladder function is normal. Patient is not sexually active. Contraception method is abstinence. Postpartum depression screening: negative.    The following portions of the patient's history were reviewed and updated as appropriate: allergies, current medications, past family history, past medical history, past social history, past surgical history and problem list.    Review of Systems  Pertinent items are noted in HPI.     Objective:      /74   Ht 5' 3" (1.6 m)   Wt 117.4 kg (258 lb 13.1 oz)   LMP 09/19/2019 (Exact Date)   BMI 45.85 kg/m²    General:  alert, appears stated age, cooperative, no distress and morbidly obese          Assessment:       Routine postpartum exam. Pap smear not done at today's visit.     Plan:      1. Contraception: condoms  2. PAP smear in February 2021  3. Follow up in: 6 months or as needed.     "

## 2021-04-07 ENCOUNTER — IMMUNIZATION (OUTPATIENT)
Dept: INTERNAL MEDICINE | Facility: CLINIC | Age: 31
End: 2021-04-07
Payer: MEDICAID

## 2021-04-07 DIAGNOSIS — Z23 NEED FOR VACCINATION: Primary | ICD-10-CM

## 2021-04-07 PROCEDURE — 91300 COVID-19, MRNA, LNP-S, PF, 30 MCG/0.3 ML DOSE VACCINE: CPT | Mod: PBBFAC | Performed by: FAMILY MEDICINE

## 2021-04-28 ENCOUNTER — IMMUNIZATION (OUTPATIENT)
Dept: INTERNAL MEDICINE | Facility: CLINIC | Age: 31
End: 2021-04-28
Payer: MEDICAID

## 2021-04-28 DIAGNOSIS — Z23 NEED FOR VACCINATION: Primary | ICD-10-CM

## 2021-04-28 PROCEDURE — 91300 COVID-19, MRNA, LNP-S, PF, 30 MCG/0.3 ML DOSE VACCINE: CPT | Mod: ,,, | Performed by: FAMILY MEDICINE

## 2021-04-28 PROCEDURE — 0002A COVID-19, MRNA, LNP-S, PF, 30 MCG/0.3 ML DOSE VACCINE: CPT | Mod: CV19,,, | Performed by: FAMILY MEDICINE

## 2021-04-28 PROCEDURE — 91300 COVID-19, MRNA, LNP-S, PF, 30 MCG/0.3 ML DOSE VACCINE: ICD-10-PCS | Mod: ,,, | Performed by: FAMILY MEDICINE

## 2021-04-28 PROCEDURE — 0002A COVID-19, MRNA, LNP-S, PF, 30 MCG/0.3 ML DOSE VACCINE: ICD-10-PCS | Mod: CV19,,, | Performed by: FAMILY MEDICINE

## 2021-04-30 ENCOUNTER — PATIENT MESSAGE (OUTPATIENT)
Dept: OBSTETRICS AND GYNECOLOGY | Facility: CLINIC | Age: 31
End: 2021-04-30

## 2021-04-30 ENCOUNTER — TELEPHONE (OUTPATIENT)
Dept: OBSTETRICS AND GYNECOLOGY | Facility: CLINIC | Age: 31
End: 2021-04-30

## 2022-12-13 ENCOUNTER — OFFICE VISIT (OUTPATIENT)
Dept: OBSTETRICS AND GYNECOLOGY | Facility: CLINIC | Age: 32
End: 2022-12-13
Payer: MEDICAID

## 2022-12-13 ENCOUNTER — LAB VISIT (OUTPATIENT)
Dept: LAB | Facility: HOSPITAL | Age: 32
End: 2022-12-13
Attending: NURSE PRACTITIONER
Payer: MEDICAID

## 2022-12-13 VITALS
WEIGHT: 273.13 LBS | BODY MASS INDEX: 48.39 KG/M2 | DIASTOLIC BLOOD PRESSURE: 70 MMHG | SYSTOLIC BLOOD PRESSURE: 116 MMHG | HEIGHT: 63 IN

## 2022-12-13 DIAGNOSIS — O26.841 UTERINE SIZE DATE DISCREPANCY PREGNANCY, FIRST TRIMESTER: ICD-10-CM

## 2022-12-13 DIAGNOSIS — Z32.01 POSITIVE PREGNANCY TEST: Primary | ICD-10-CM

## 2022-12-13 DIAGNOSIS — N91.2 AMENORRHEA: ICD-10-CM

## 2022-12-13 DIAGNOSIS — Z32.01 POSITIVE PREGNANCY TEST: ICD-10-CM

## 2022-12-13 LAB
ABO + RH BLD: NORMAL
B-HCG UR QL: POSITIVE
BASOPHILS # BLD AUTO: 0.05 K/UL (ref 0–0.2)
BASOPHILS NFR BLD: 0.6 % (ref 0–1.9)
BLD GP AB SCN CELLS X3 SERPL QL: NORMAL
CTP QC/QA: YES
DIFFERENTIAL METHOD: NORMAL
EOSINOPHIL # BLD AUTO: 0.1 K/UL (ref 0–0.5)
EOSINOPHIL NFR BLD: 1.1 % (ref 0–8)
ERYTHROCYTE [DISTWIDTH] IN BLOOD BY AUTOMATED COUNT: 14.5 % (ref 11.5–14.5)
HAV IGM SERPL QL IA: NORMAL
HBV CORE IGM SERPL QL IA: NORMAL
HBV SURFACE AG SERPL QL IA: NORMAL
HCT VFR BLD AUTO: 38.5 % (ref 37–48.5)
HCV AB SERPL QL IA: NORMAL
HGB BLD-MCNC: 12.6 G/DL (ref 12–16)
HIV 1+2 AB+HIV1 P24 AG SERPL QL IA: NORMAL
IMM GRANULOCYTES # BLD AUTO: 0.01 K/UL (ref 0–0.04)
IMM GRANULOCYTES NFR BLD AUTO: 0.1 % (ref 0–0.5)
LYMPHOCYTES # BLD AUTO: 2.6 K/UL (ref 1–4.8)
LYMPHOCYTES NFR BLD: 31.2 % (ref 18–48)
MCH RBC QN AUTO: 29.9 PG (ref 27–31)
MCHC RBC AUTO-ENTMCNC: 32.7 G/DL (ref 32–36)
MCV RBC AUTO: 91 FL (ref 82–98)
MONOCYTES # BLD AUTO: 0.6 K/UL (ref 0.3–1)
MONOCYTES NFR BLD: 7.2 % (ref 4–15)
NEUTROPHILS # BLD AUTO: 4.9 K/UL (ref 1.8–7.7)
NEUTROPHILS NFR BLD: 59.8 % (ref 38–73)
NRBC BLD-RTO: 0 /100 WBC
PLATELET # BLD AUTO: 214 K/UL (ref 150–450)
PMV BLD AUTO: 11.7 FL (ref 9.2–12.9)
RBC # BLD AUTO: 4.21 M/UL (ref 4–5.4)
WBC # BLD AUTO: 8.18 K/UL (ref 3.9–12.7)

## 2022-12-13 PROCEDURE — 81025 URINE PREGNANCY TEST: CPT | Mod: PBBFAC | Performed by: MIDWIFE

## 2022-12-13 PROCEDURE — 86762 RUBELLA ANTIBODY: CPT | Performed by: MIDWIFE

## 2022-12-13 PROCEDURE — 85025 COMPLETE CBC W/AUTO DIFF WBC: CPT | Performed by: MIDWIFE

## 2022-12-13 PROCEDURE — 36415 COLL VENOUS BLD VENIPUNCTURE: CPT | Performed by: MIDWIFE

## 2022-12-13 PROCEDURE — 87491 CHLMYD TRACH DNA AMP PROBE: CPT | Performed by: MIDWIFE

## 2022-12-13 PROCEDURE — 3078F PR MOST RECENT DIASTOLIC BLOOD PRESSURE < 80 MM HG: ICD-10-PCS | Mod: CPTII,,, | Performed by: MIDWIFE

## 2022-12-13 PROCEDURE — 87591 N.GONORRHOEAE DNA AMP PROB: CPT | Performed by: MIDWIFE

## 2022-12-13 PROCEDURE — 86592 SYPHILIS TEST NON-TREP QUAL: CPT | Performed by: MIDWIFE

## 2022-12-13 PROCEDURE — 99213 PR OFFICE/OUTPT VISIT, EST, LEVL III, 20-29 MIN: ICD-10-PCS | Mod: S$PBB,TH,, | Performed by: MIDWIFE

## 2022-12-13 PROCEDURE — 1159F MED LIST DOCD IN RCRD: CPT | Mod: CPTII,,, | Performed by: MIDWIFE

## 2022-12-13 PROCEDURE — 1159F PR MEDICATION LIST DOCUMENTED IN MEDICAL RECORD: ICD-10-PCS | Mod: CPTII,,, | Performed by: MIDWIFE

## 2022-12-13 PROCEDURE — 80074 ACUTE HEPATITIS PANEL: CPT | Performed by: MIDWIFE

## 2022-12-13 PROCEDURE — 87086 URINE CULTURE/COLONY COUNT: CPT | Performed by: MIDWIFE

## 2022-12-13 PROCEDURE — 99213 OFFICE O/P EST LOW 20 MIN: CPT | Mod: S$PBB,TH,, | Performed by: MIDWIFE

## 2022-12-13 PROCEDURE — 99999 PR PBB SHADOW E&M-EST. PATIENT-LVL III: CPT | Mod: PBBFAC,,, | Performed by: MIDWIFE

## 2022-12-13 PROCEDURE — 99999 PR PBB SHADOW E&M-EST. PATIENT-LVL III: ICD-10-PCS | Mod: PBBFAC,,, | Performed by: MIDWIFE

## 2022-12-13 PROCEDURE — 3008F PR BODY MASS INDEX (BMI) DOCUMENTED: ICD-10-PCS | Mod: CPTII,,, | Performed by: MIDWIFE

## 2022-12-13 PROCEDURE — 3078F DIAST BP <80 MM HG: CPT | Mod: CPTII,,, | Performed by: MIDWIFE

## 2022-12-13 PROCEDURE — 88175 CYTOPATH C/V AUTO FLUID REDO: CPT | Performed by: MIDWIFE

## 2022-12-13 PROCEDURE — 3008F BODY MASS INDEX DOCD: CPT | Mod: CPTII,,, | Performed by: MIDWIFE

## 2022-12-13 PROCEDURE — 3074F PR MOST RECENT SYSTOLIC BLOOD PRESSURE < 130 MM HG: ICD-10-PCS | Mod: CPTII,,, | Performed by: MIDWIFE

## 2022-12-13 PROCEDURE — 87389 HIV-1 AG W/HIV-1&-2 AB AG IA: CPT | Performed by: MIDWIFE

## 2022-12-13 PROCEDURE — 87624 HPV HI-RISK TYP POOLED RSLT: CPT | Performed by: MIDWIFE

## 2022-12-13 PROCEDURE — 83021 HEMOGLOBIN CHROMOTOGRAPHY: CPT | Performed by: MIDWIFE

## 2022-12-13 PROCEDURE — 86850 RBC ANTIBODY SCREEN: CPT | Performed by: MIDWIFE

## 2022-12-13 PROCEDURE — 3074F SYST BP LT 130 MM HG: CPT | Mod: CPTII,,, | Performed by: MIDWIFE

## 2022-12-13 PROCEDURE — 99213 OFFICE O/P EST LOW 20 MIN: CPT | Mod: PBBFAC,TH | Performed by: MIDWIFE

## 2022-12-13 NOTE — PROGRESS NOTES
Naseem Moura  complains of amenorrhea.  Patient's last menstrual period was 10/15/2022.. UPT is Positive.  She is taking a PNV.  She denies nausea/vomiting.    History reviewed. No pertinent past medical history.  History reviewed. No pertinent surgical history.  Family History   Problem Relation Age of Onset    Hypertension Mother      Review of patient's allergies indicates:  No Known Allergies  Social History     Socioeconomic History    Marital status:    Tobacco Use    Smoking status: Never    Smokeless tobacco: Never   Substance and Sexual Activity    Alcohol use: Never    Drug use: Never    Sexual activity: Not Currently     Partners: Male       ROS:  GENERAL: No fever, chills, fatigability or weight loss.  VULVAR: No pain, no lesions and no itching.  VAGINAL: No relaxation, no itching, no discharge, no abnormal bleeding and no lesions.  ABDOMEN: No abdominal pain. Denies nausea. Denies vomiting. No diarrhea. No constipation  BREAST: Denies pain. No lumps. No discharge.  URINARY: No incontinence, no nocturia, no frequency and no dysuria.  CARDIOVASCULAR: No chest pain. No shortness of breath. No leg cramps.  NEUROLOGICAL: no headaches. No vision changes.      Vitals:    22 0824   BP: 116/70     GENERAL: healthy, alert, no distress, cooperative  GENITALIA: normal external genitalia, no erythema, no discharge  URETHRA: not indicated  VAGINA: normal vagina, no discharge, exudate, lesion, or erythema  CERVIX: normal, pap and gen probe obtained  UTERUS: size consistent with 8 weeks  ADNEXA: normal adnexa and no mass, fullness, tenderness      Naseem was seen today for possible pregnancy.    Diagnoses and all orders for this visit:    Positive pregnancy test  -     Rubella Antibody, IgG; Future  -     RPR; Future  -     Hemoglobin Electrophoresis,Hgb A2 Sang.; Future  -     C. trachomatis/N. gonorrhoeae by AMP DNA  -     Type & Screen - Ob Profile; Future  -     Urine culture  -     CBC  Auto Differential; Future  -     HIV 1/2 Ag/Ab (4th Gen); Future  -     Hepatitis Panel, Acute; Future  -     Liquid-Based Pap Smear, Screening  -     HPV High Risk Genotypes, PCR    Uterine size date discrepancy pregnancy, first trimester  -     US OB/GYN Procedure ([facility])-Future; Future    Amenorrhea  -     POCT Urine Pregnancy        Us and Initial OB tomorrow

## 2022-12-14 ENCOUNTER — INITIAL PRENATAL (OUTPATIENT)
Dept: OBSTETRICS AND GYNECOLOGY | Facility: CLINIC | Age: 32
End: 2022-12-14
Payer: MEDICAID

## 2022-12-14 ENCOUNTER — PROCEDURE VISIT (OUTPATIENT)
Dept: OBSTETRICS AND GYNECOLOGY | Facility: CLINIC | Age: 32
End: 2022-12-14
Payer: MEDICAID

## 2022-12-14 VITALS
DIASTOLIC BLOOD PRESSURE: 68 MMHG | SYSTOLIC BLOOD PRESSURE: 120 MMHG | WEIGHT: 271.63 LBS | BODY MASS INDEX: 48.11 KG/M2

## 2022-12-14 DIAGNOSIS — O99.210 OBESITY IN PREGNANCY: Primary | ICD-10-CM

## 2022-12-14 DIAGNOSIS — Z3A.01 6 WEEKS GESTATION OF PREGNANCY: ICD-10-CM

## 2022-12-14 DIAGNOSIS — O26.841 UTERINE SIZE DATE DISCREPANCY PREGNANCY, FIRST TRIMESTER: ICD-10-CM

## 2022-12-14 LAB
C TRACH DNA SPEC QL NAA+PROBE: NOT DETECTED
HGB A2 MFR BLD HPLC: 2.5 % (ref 2.2–3.2)
HGB FRACT BLD ELPH-IMP: NORMAL
HGB FRACT BLD ELPH-IMP: NORMAL
N GONORRHOEA DNA SPEC QL NAA+PROBE: NOT DETECTED
RPR SER QL: NORMAL
RUBV IGG SER-ACNC: 141 IU/ML
RUBV IGG SER-IMP: REACTIVE

## 2022-12-14 PROCEDURE — 99212 OFFICE O/P EST SF 10 MIN: CPT | Mod: PBBFAC,TH,25 | Performed by: MIDWIFE

## 2022-12-14 PROCEDURE — 99213 OFFICE O/P EST LOW 20 MIN: CPT | Mod: TH,S$PBB,, | Performed by: MIDWIFE

## 2022-12-14 PROCEDURE — 99999 PR PBB SHADOW E&M-EST. PATIENT-LVL II: CPT | Mod: PBBFAC,,, | Performed by: MIDWIFE

## 2022-12-14 PROCEDURE — 76801 OB US < 14 WKS SINGLE FETUS: CPT | Mod: PBBFAC | Performed by: OBSTETRICS & GYNECOLOGY

## 2022-12-14 PROCEDURE — 99213 PR OFFICE/OUTPT VISIT, EST, LEVL III, 20-29 MIN: ICD-10-PCS | Mod: TH,S$PBB,, | Performed by: MIDWIFE

## 2022-12-14 PROCEDURE — 76801 US OB/GYN PROCEDURE (VIEWPOINT): ICD-10-PCS | Mod: 26,S$PBB,, | Performed by: OBSTETRICS & GYNECOLOGY

## 2022-12-14 PROCEDURE — 99999 PR PBB SHADOW E&M-EST. PATIENT-LVL II: ICD-10-PCS | Mod: PBBFAC,,, | Performed by: MIDWIFE

## 2022-12-14 NOTE — PROGRESS NOTES
Chief Complaint   Patient presents with    Initial Prenatal Visit       32 y.o.,  at 6w4d by US on 22    Patient presents today for initial prenatal visit.    Complaints today:  Doing well overall.     ROS  OBSTETRICS:   Cramping no   Bleeding no    GASTRO:   Nausea no   Vomiting no      OB History    Para Term  AB Living   3 1 1   1 1   SAB IAB Ectopic Multiple Live Births   1     0 1      # Outcome Date GA Lbr Art/2nd Weight Sex Delivery Anes PTL Lv   3 Current            2 Term 20 38w2d / 01:06 3.34 kg (7 lb 5.8 oz) M Vag-Spont EPI N SANJIV   1 SAB 2018 6w0d              Allergies and problem list reviewed and updated  Medical and surgical history reviewed    PHYSICAL EXAM  LMP 10/15/2022     GENERAL: No acute distress  NEURO: Alert and oriented x3  PSYCH: Calm, normal mood and affect      ASSESSMENT AND PLAN     Problems (from 22 to present)     No problems associated with this episode.            Initial labs reviewed: , HIV Negative, blood type: O +, GC/CT in process  Dating u/s reviewed: SIUP, 6w 4d, ARANZA 23, normal  Urine culture  Still pending.  Discussed prenatal vitamin options (i.e. stool softener, DHA).    Pregnancy course discussed and 'AtoZ' book given. Patient was counseled on proper weight gain based on the [place] [employer]'s recommendations based on her pre-pregnancy weight. Discussed foods to avoid in pregnancy (i.e. sushi, fish that are high in mercury, deli meat, and unpasteurized cheeses). Discussed prenatal vitamin options (i.e. stool softener, DHA). Discussed potential medical problems in pregnancy.  Oriented to practice including CNM collaboration.   Questions answered.    Education regarding warning signs.  Follow up: 4 wks, call or present sooner prn.

## 2022-12-15 LAB
BACTERIA UR CULT: NORMAL
BACTERIA UR CULT: NORMAL

## 2023-01-11 ENCOUNTER — LAB VISIT (OUTPATIENT)
Dept: LAB | Facility: HOSPITAL | Age: 33
End: 2023-01-11
Attending: MIDWIFE
Payer: MEDICAID

## 2023-01-11 ENCOUNTER — PROCEDURE VISIT (OUTPATIENT)
Dept: OBSTETRICS AND GYNECOLOGY | Facility: CLINIC | Age: 33
End: 2023-01-11
Payer: MEDICAID

## 2023-01-11 ENCOUNTER — ROUTINE PRENATAL (OUTPATIENT)
Dept: OBSTETRICS AND GYNECOLOGY | Facility: CLINIC | Age: 33
End: 2023-01-11
Payer: MEDICAID

## 2023-01-11 ENCOUNTER — PATIENT MESSAGE (OUTPATIENT)
Dept: ADMINISTRATIVE | Facility: OTHER | Age: 33
End: 2023-01-11
Payer: MEDICAID

## 2023-01-11 VITALS
SYSTOLIC BLOOD PRESSURE: 130 MMHG | DIASTOLIC BLOOD PRESSURE: 70 MMHG | WEIGHT: 270.31 LBS | BODY MASS INDEX: 47.88 KG/M2

## 2023-01-11 DIAGNOSIS — Z3A.10 10 WEEKS GESTATION OF PREGNANCY: ICD-10-CM

## 2023-01-11 DIAGNOSIS — O99.210 OBESITY IN PREGNANCY: ICD-10-CM

## 2023-01-11 DIAGNOSIS — O99.210 OBESITY IN PREGNANCY: Primary | ICD-10-CM

## 2023-01-11 PROCEDURE — 99213 PR OFFICE/OUTPT VISIT, EST, LEVL III, 20-29 MIN: ICD-10-PCS | Mod: TH,S$PBB,, | Performed by: MIDWIFE

## 2023-01-11 PROCEDURE — 76801 OB US < 14 WKS SINGLE FETUS: CPT | Mod: PBBFAC | Performed by: OBSTETRICS & GYNECOLOGY

## 2023-01-11 PROCEDURE — 99213 OFFICE O/P EST LOW 20 MIN: CPT | Mod: TH,S$PBB,, | Performed by: MIDWIFE

## 2023-01-11 PROCEDURE — 99999 PR PBB SHADOW E&M-EST. PATIENT-LVL II: CPT | Mod: PBBFAC,,, | Performed by: MIDWIFE

## 2023-01-11 PROCEDURE — 99212 OFFICE O/P EST SF 10 MIN: CPT | Mod: PBBFAC,TH | Performed by: MIDWIFE

## 2023-01-11 PROCEDURE — 99999 PR PBB SHADOW E&M-EST. PATIENT-LVL II: ICD-10-PCS | Mod: PBBFAC,,, | Performed by: MIDWIFE

## 2023-01-11 PROCEDURE — 76801 US OB/GYN PROCEDURE (VIEWPOINT): ICD-10-PCS | Mod: 26,S$PBB,, | Performed by: OBSTETRICS & GYNECOLOGY

## 2023-01-11 PROCEDURE — 36415 COLL VENOUS BLD VENIPUNCTURE: CPT | Performed by: MIDWIFE

## 2023-01-11 NOTE — PROGRESS NOTES
32 y.o. female  at 10w4d   denies VB or cramping  Doing well without concerns   First trimester s/s improving, no appetite  TWG: -2 lbs   Reviewed prenatal labs  Genetic testing MT21 today  Reviewed warning signs, pregnancy precautions and how/when to call.  RTC x 4 wks, call or present sooner prn.     I spent a total of 20 minutes on the day of the visit.This includes face to face time and non-face to face time preparing to see the patient (eg, review of tests), Obtaining and/or reviewing separately obtained history, Documenting clinical information in the electronic or other health record, Independently interpreting resultsand communicating results to the patient/family/caregiver, or Care coordination.

## 2023-01-13 ENCOUNTER — PATIENT MESSAGE (OUTPATIENT)
Dept: OBSTETRICS AND GYNECOLOGY | Facility: CLINIC | Age: 33
End: 2023-01-13
Payer: MEDICAID

## 2023-01-24 ENCOUNTER — PATIENT MESSAGE (OUTPATIENT)
Dept: OBSTETRICS AND GYNECOLOGY | Facility: CLINIC | Age: 33
End: 2023-01-24
Payer: MEDICAID

## 2023-01-30 ENCOUNTER — ROUTINE PRENATAL (OUTPATIENT)
Dept: OBSTETRICS AND GYNECOLOGY | Facility: CLINIC | Age: 33
End: 2023-01-30
Payer: MEDICAID

## 2023-01-30 VITALS — BODY MASS INDEX: 47.57 KG/M2 | WEIGHT: 268.5 LBS | DIASTOLIC BLOOD PRESSURE: 84 MMHG | SYSTOLIC BLOOD PRESSURE: 128 MMHG

## 2023-01-30 DIAGNOSIS — Z23 NEEDS FLU SHOT: Primary | ICD-10-CM

## 2023-01-30 DIAGNOSIS — O99.210 OBESITY IN PREGNANCY: ICD-10-CM

## 2023-01-30 PROCEDURE — 99212 OFFICE O/P EST SF 10 MIN: CPT | Mod: PBBFAC,TH | Performed by: ADVANCED PRACTICE MIDWIFE

## 2023-01-30 PROCEDURE — 90686 IIV4 VACC NO PRSV 0.5 ML IM: CPT | Mod: PBBFAC

## 2023-01-30 PROCEDURE — 99999 PR PBB SHADOW E&M-EST. PATIENT-LVL II: CPT | Mod: PBBFAC,,, | Performed by: ADVANCED PRACTICE MIDWIFE

## 2023-01-30 PROCEDURE — 99999 PR PBB SHADOW E&M-EST. PATIENT-LVL II: ICD-10-PCS | Mod: PBBFAC,,, | Performed by: ADVANCED PRACTICE MIDWIFE

## 2023-01-30 PROCEDURE — 99213 PR OFFICE/OUTPT VISIT, EST, LEVL III, 20-29 MIN: ICD-10-PCS | Mod: TH,S$PBB,, | Performed by: ADVANCED PRACTICE MIDWIFE

## 2023-01-30 PROCEDURE — 99213 OFFICE O/P EST LOW 20 MIN: CPT | Mod: TH,S$PBB,, | Performed by: ADVANCED PRACTICE MIDWIFE

## 2023-01-30 RX ORDER — NAPROXEN SODIUM 220 MG/1
81 TABLET, FILM COATED ORAL DAILY
Qty: 60 TABLET | Refills: 2 | Status: ON HOLD | OUTPATIENT
Start: 2023-01-30 | End: 2023-08-04 | Stop reason: HOSPADM

## 2023-01-30 NOTE — PROGRESS NOTES
Two patient identifiers verified. Patient notified to wait in clinic 15 minutes after injection, patient verbalized understanding. Flu vaccine administered IM to patient's left deltoid. Patient tolerated well.

## 2023-01-30 NOTE — PROGRESS NOTES
32 y.o. female  at 13w2d   denies VB or cramping  Doing well without concerns   First trimester s/s improving    TWG: -4 lbs   Reviewed prenatal labs   Genetic testing [id number] negative, having gender reveal Saturday!  Flu vaccine recommended and given today  Start ASA daily    Reviewed warning signs, pregnancy precautions and how/when to call.  RTC x 4 wks, call or present sooner prn.     I spent a total of 20 minutes on the day of the visit.This includes face to face time and non-face to face time preparing to see the patient (eg, review of tests), Obtaining and/or reviewing separately obtained history, Documenting clinical information in the electronic or other health record, Independently interpreting resultsand communicating results to the patient/family/caregiver, or Care coordination.

## 2023-02-18 ENCOUNTER — PATIENT MESSAGE (OUTPATIENT)
Dept: OTHER | Facility: OTHER | Age: 33
End: 2023-02-18
Payer: MEDICAID

## 2023-02-25 ENCOUNTER — PATIENT MESSAGE (OUTPATIENT)
Dept: OTHER | Facility: OTHER | Age: 33
End: 2023-02-25
Payer: MEDICAID

## 2023-03-29 ENCOUNTER — TELEPHONE (OUTPATIENT)
Dept: OBSTETRICS AND GYNECOLOGY | Facility: CLINIC | Age: 33
End: 2023-03-29
Payer: MEDICAID

## 2023-03-29 NOTE — TELEPHONE ENCOUNTER
----- Message from Stephanie Luke sent at 3/29/2023 12:21 PM CDT -----  States she would like to reschedule her on Monday. Nothing coming up on the schedule. Please call pt [phone]. Thank you

## 2023-04-04 ENCOUNTER — PROCEDURE VISIT (OUTPATIENT)
Dept: OBSTETRICS AND GYNECOLOGY | Facility: CLINIC | Age: 33
End: 2023-04-04
Payer: MEDICAID

## 2023-04-04 ENCOUNTER — ROUTINE PRENATAL (OUTPATIENT)
Dept: OBSTETRICS AND GYNECOLOGY | Facility: CLINIC | Age: 33
End: 2023-04-04
Payer: MEDICAID

## 2023-04-04 VITALS — WEIGHT: 272.5 LBS | SYSTOLIC BLOOD PRESSURE: 120 MMHG | DIASTOLIC BLOOD PRESSURE: 78 MMHG | BODY MASS INDEX: 48.27 KG/M2

## 2023-04-04 DIAGNOSIS — Z3A.22 22 WEEKS GESTATION OF PREGNANCY: ICD-10-CM

## 2023-04-04 DIAGNOSIS — Z3A.22 22 WEEKS GESTATION OF PREGNANCY: Primary | ICD-10-CM

## 2023-04-04 DIAGNOSIS — Z36.2 ENCOUNTER FOR FOLLOW-UP ULTRASOUND OF FETAL ANATOMY: Primary | ICD-10-CM

## 2023-04-04 DIAGNOSIS — M54.32 LEFT SCIATIC NERVE PAIN: ICD-10-CM

## 2023-04-04 PROBLEM — Z34.92 NORMAL PREGNANCY IN SECOND TRIMESTER: Status: ACTIVE | Noted: 2023-04-04

## 2023-04-04 PROCEDURE — 99999 PR PBB SHADOW E&M-EST. PATIENT-LVL II: CPT | Mod: PBBFAC,,, | Performed by: MIDWIFE

## 2023-04-04 PROCEDURE — 76811 OB US DETAILED SNGL FETUS: CPT | Mod: PBBFAC | Performed by: OBSTETRICS & GYNECOLOGY

## 2023-04-04 PROCEDURE — 99999 PR PBB SHADOW E&M-EST. PATIENT-LVL II: ICD-10-PCS | Mod: PBBFAC,,, | Performed by: MIDWIFE

## 2023-04-04 PROCEDURE — 76811 US OB/GYN PROCEDURE (VIEWPOINT): ICD-10-PCS | Mod: 26,S$PBB,, | Performed by: OBSTETRICS & GYNECOLOGY

## 2023-04-04 PROCEDURE — 99212 OFFICE O/P EST SF 10 MIN: CPT | Mod: PBBFAC | Performed by: MIDWIFE

## 2023-04-04 PROCEDURE — 99214 PR OFFICE/OUTPT VISIT, EST, LEVL IV, 30-39 MIN: ICD-10-PCS | Mod: TH,S$PBB,, | Performed by: MIDWIFE

## 2023-04-04 PROCEDURE — 99214 OFFICE O/P EST MOD 30 MIN: CPT | Mod: TH,S$PBB,, | Performed by: MIDWIFE

## 2023-04-05 NOTE — PROGRESS NOTES
22 weeks gestation of pregnancy  -     US OB/GYN Procedure ([facility])-Future; Future  -     BREAST PUMP FOR HOME USE    Lapse in care since 13w2d  Doing well, + fetal movement  Denies CTX, VB, LOF  Mat21 normal/male  Reviewed birth plan. Desires epidural for pain management.  Plans to breast feed. Breast pump prescription and instructions given.  No circumcision for baby boy  Is going to [place] the 1st week of May. Reviewed travel/blood clot precautions.  10.6oz total weight loss  2nd trimester precautions reviewed  Patient wants anatomy scan today, but no openings at the [facility]. She will go to [facility] visit.       BMI 45.0-49.9, adult    Reports is taking daily baby aspirin as prescribed. Instructed to continue until delivered.     Left sciatic nerve pain    Intermittent left sciatic pain. Declines PT at this time, but will let me know if she changes her mind.

## 2023-04-15 ENCOUNTER — PATIENT MESSAGE (OUTPATIENT)
Dept: OTHER | Facility: OTHER | Age: 33
End: 2023-04-15
Payer: MEDICAID

## 2023-04-29 ENCOUNTER — PATIENT MESSAGE (OUTPATIENT)
Dept: OTHER | Facility: OTHER | Age: 33
End: 2023-04-29
Payer: MEDICAID

## 2023-05-02 ENCOUNTER — PROCEDURE VISIT (OUTPATIENT)
Dept: OBSTETRICS AND GYNECOLOGY | Facility: CLINIC | Age: 33
End: 2023-05-02
Payer: MEDICAID

## 2023-05-02 ENCOUNTER — ROUTINE PRENATAL (OUTPATIENT)
Dept: OBSTETRICS AND GYNECOLOGY | Facility: CLINIC | Age: 33
End: 2023-05-02
Payer: MEDICAID

## 2023-05-02 VITALS
BODY MASS INDEX: 49.36 KG/M2 | SYSTOLIC BLOOD PRESSURE: 118 MMHG | DIASTOLIC BLOOD PRESSURE: 70 MMHG | WEIGHT: 278.69 LBS

## 2023-05-02 DIAGNOSIS — Z3A.26 26 WEEKS GESTATION OF PREGNANCY: ICD-10-CM

## 2023-05-02 DIAGNOSIS — Z36.2 ENCOUNTER FOR FOLLOW-UP ULTRASOUND OF FETAL ANATOMY: ICD-10-CM

## 2023-05-02 DIAGNOSIS — O99.210 OBESITY IN PREGNANCY: Primary | ICD-10-CM

## 2023-05-02 PROBLEM — Z34.92 NORMAL PREGNANCY IN SECOND TRIMESTER: Status: RESOLVED | Noted: 2023-04-04 | Resolved: 2023-05-02

## 2023-05-02 PROCEDURE — 99999 PR PBB SHADOW E&M-EST. PATIENT-LVL II: ICD-10-PCS | Mod: PBBFAC,,, | Performed by: MIDWIFE

## 2023-05-02 PROCEDURE — 76816 US OB/GYN PROCEDURE (VIEWPOINT): ICD-10-PCS | Mod: 26,S$PBB,, | Performed by: OBSTETRICS & GYNECOLOGY

## 2023-05-02 PROCEDURE — 99213 PR OFFICE/OUTPT VISIT, EST, LEVL III, 20-29 MIN: ICD-10-PCS | Mod: TH,S$PBB,, | Performed by: MIDWIFE

## 2023-05-02 PROCEDURE — 99213 OFFICE O/P EST LOW 20 MIN: CPT | Mod: TH,S$PBB,, | Performed by: MIDWIFE

## 2023-05-02 PROCEDURE — 76816 OB US FOLLOW-UP PER FETUS: CPT | Mod: PBBFAC | Performed by: OBSTETRICS & GYNECOLOGY

## 2023-05-02 PROCEDURE — 99212 OFFICE O/P EST SF 10 MIN: CPT | Mod: PBBFAC,TH | Performed by: MIDWIFE

## 2023-05-02 PROCEDURE — 99999 PR PBB SHADOW E&M-EST. PATIENT-LVL II: CPT | Mod: PBBFAC,,, | Performed by: MIDWIFE

## 2023-05-02 NOTE — PROGRESS NOTES
32 y.o. female  at 26w3d   Reports + FM, denies VB, LOF, or cramping  Doing well without concerns   TW lbs   Pt is going on vacation to [place] this week and returning in 2 weeks  US for sub opt anatomy, some remains sub opt, will repeat at 32 wks when starting weekly BPP's  Reviewed upcoming 28wk labs, (O POS) and orders placed, tdap handout provided and explained  Reviewed warning signs, normal FM,  labor precautions and how/when to call.  RTC x 2 wks, call or present sooner prn.

## 2023-05-13 ENCOUNTER — PATIENT MESSAGE (OUTPATIENT)
Dept: OTHER | Facility: OTHER | Age: 33
End: 2023-05-13
Payer: MEDICAID

## 2023-05-18 ENCOUNTER — LAB VISIT (OUTPATIENT)
Dept: LAB | Facility: HOSPITAL | Age: 33
End: 2023-05-18
Attending: MIDWIFE
Payer: MEDICAID

## 2023-05-18 ENCOUNTER — ROUTINE PRENATAL (OUTPATIENT)
Dept: OBSTETRICS AND GYNECOLOGY | Facility: CLINIC | Age: 33
End: 2023-05-18
Payer: MEDICAID

## 2023-05-18 VITALS
DIASTOLIC BLOOD PRESSURE: 72 MMHG | SYSTOLIC BLOOD PRESSURE: 118 MMHG | WEIGHT: 280.19 LBS | BODY MASS INDEX: 49.64 KG/M2

## 2023-05-18 DIAGNOSIS — Z23 NEED FOR TDAP VACCINATION: ICD-10-CM

## 2023-05-18 DIAGNOSIS — O99.210 OBESITY IN PREGNANCY: Primary | ICD-10-CM

## 2023-05-18 DIAGNOSIS — Z3A.28 28 WEEKS GESTATION OF PREGNANCY: ICD-10-CM

## 2023-05-18 DIAGNOSIS — O99.210 OBESITY IN PREGNANCY: ICD-10-CM

## 2023-05-18 LAB
BASOPHILS # BLD AUTO: 0.02 K/UL (ref 0–0.2)
BASOPHILS NFR BLD: 0.2 % (ref 0–1.9)
DIFFERENTIAL METHOD: ABNORMAL
EOSINOPHIL # BLD AUTO: 0.1 K/UL (ref 0–0.5)
EOSINOPHIL NFR BLD: 0.7 % (ref 0–8)
ERYTHROCYTE [DISTWIDTH] IN BLOOD BY AUTOMATED COUNT: 14.2 % (ref 11.5–14.5)
GLUCOSE SERPL-MCNC: 182 MG/DL (ref 70–140)
HCT VFR BLD AUTO: 37.5 % (ref 37–48.5)
HGB BLD-MCNC: 11.7 G/DL (ref 12–16)
HIV 1+2 AB+HIV1 P24 AG SERPL QL IA: NORMAL
IMM GRANULOCYTES # BLD AUTO: 0.04 K/UL (ref 0–0.04)
IMM GRANULOCYTES NFR BLD AUTO: 0.4 % (ref 0–0.5)
LYMPHOCYTES # BLD AUTO: 1.8 K/UL (ref 1–4.8)
LYMPHOCYTES NFR BLD: 17.1 % (ref 18–48)
MCH RBC QN AUTO: 29.8 PG (ref 27–31)
MCHC RBC AUTO-ENTMCNC: 31.2 G/DL (ref 32–36)
MCV RBC AUTO: 95 FL (ref 82–98)
MONOCYTES # BLD AUTO: 0.5 K/UL (ref 0.3–1)
MONOCYTES NFR BLD: 4.2 % (ref 4–15)
NEUTROPHILS # BLD AUTO: 8.3 K/UL (ref 1.8–7.7)
NEUTROPHILS NFR BLD: 77.4 % (ref 38–73)
NRBC BLD-RTO: 0 /100 WBC
PLATELET # BLD AUTO: 241 K/UL (ref 150–450)
PMV BLD AUTO: 11.3 FL (ref 9.2–12.9)
RBC # BLD AUTO: 3.93 M/UL (ref 4–5.4)
WBC # BLD AUTO: 10.68 K/UL (ref 3.9–12.7)

## 2023-05-18 PROCEDURE — 90471 IMMUNIZATION ADMIN: CPT | Mod: PBBFAC

## 2023-05-18 PROCEDURE — 99214 OFFICE O/P EST MOD 30 MIN: CPT | Mod: TH,S$PBB,, | Performed by: MIDWIFE

## 2023-05-18 PROCEDURE — 87389 HIV-1 AG W/HIV-1&-2 AB AG IA: CPT | Performed by: MIDWIFE

## 2023-05-18 PROCEDURE — 99999 PR PBB SHADOW E&M-EST. PATIENT-LVL II: CPT | Mod: PBBFAC,,, | Performed by: MIDWIFE

## 2023-05-18 PROCEDURE — 82950 GLUCOSE TEST: CPT | Performed by: MIDWIFE

## 2023-05-18 PROCEDURE — 99999 PR PBB SHADOW E&M-EST. PATIENT-LVL II: ICD-10-PCS | Mod: PBBFAC,,, | Performed by: MIDWIFE

## 2023-05-18 PROCEDURE — 36415 COLL VENOUS BLD VENIPUNCTURE: CPT | Performed by: MIDWIFE

## 2023-05-18 PROCEDURE — 90715 TDAP VACCINE 7 YRS/> IM: CPT | Mod: PBBFAC

## 2023-05-18 PROCEDURE — 99214 PR OFFICE/OUTPT VISIT, EST, LEVL IV, 30-39 MIN: ICD-10-PCS | Mod: TH,S$PBB,, | Performed by: MIDWIFE

## 2023-05-18 PROCEDURE — 99212 OFFICE O/P EST SF 10 MIN: CPT | Mod: PBBFAC,TH | Performed by: MIDWIFE

## 2023-05-18 PROCEDURE — 86592 SYPHILIS TEST NON-TREP QUAL: CPT | Performed by: MIDWIFE

## 2023-05-18 PROCEDURE — 85025 COMPLETE CBC W/AUTO DIFF WBC: CPT | Performed by: MIDWIFE

## 2023-05-19 ENCOUNTER — PATIENT MESSAGE (OUTPATIENT)
Dept: OBSTETRICS AND GYNECOLOGY | Facility: CLINIC | Age: 33
End: 2023-05-19
Payer: MEDICAID

## 2023-05-19 LAB — RPR SER QL: NORMAL

## 2023-05-21 ENCOUNTER — PATIENT MESSAGE (OUTPATIENT)
Dept: OBSTETRICS AND GYNECOLOGY | Facility: CLINIC | Age: 33
End: 2023-05-21
Payer: MEDICAID

## 2023-05-21 DIAGNOSIS — O99.810 ABNORMAL O'SULLIVAN GLUCOSE CHALLENGE TEST, ANTEPARTUM: ICD-10-CM

## 2023-05-24 ENCOUNTER — PATIENT MESSAGE (OUTPATIENT)
Dept: OBSTETRICS AND GYNECOLOGY | Facility: CLINIC | Age: 33
End: 2023-05-24
Payer: MEDICAID

## 2023-05-27 ENCOUNTER — PATIENT MESSAGE (OUTPATIENT)
Dept: OTHER | Facility: OTHER | Age: 33
End: 2023-05-27
Payer: MEDICAID

## 2023-05-31 ENCOUNTER — LAB VISIT (OUTPATIENT)
Dept: LAB | Facility: HOSPITAL | Age: 33
End: 2023-05-31
Attending: MIDWIFE
Payer: MEDICAID

## 2023-05-31 DIAGNOSIS — O99.810 ABNORMAL O'SULLIVAN GLUCOSE CHALLENGE TEST, ANTEPARTUM: ICD-10-CM

## 2023-05-31 LAB
GLUCOSE SERPL-MCNC: 146 MG/DL
GLUCOSE SERPL-MCNC: 154 MG/DL
GLUCOSE SERPL-MCNC: 196 MG/DL
GLUCOSE SERPL-MCNC: 95 MG/DL (ref 70–110)

## 2023-05-31 PROCEDURE — 36415 COLL VENOUS BLD VENIPUNCTURE: CPT | Performed by: MIDWIFE

## 2023-05-31 PROCEDURE — 82951 GLUCOSE TOLERANCE TEST (GTT): CPT | Performed by: MIDWIFE

## 2023-06-02 ENCOUNTER — PATIENT MESSAGE (OUTPATIENT)
Dept: OBSTETRICS AND GYNECOLOGY | Facility: CLINIC | Age: 33
End: 2023-06-02

## 2023-06-02 ENCOUNTER — ROUTINE PRENATAL (OUTPATIENT)
Dept: OBSTETRICS AND GYNECOLOGY | Facility: CLINIC | Age: 33
End: 2023-06-02
Payer: MEDICAID

## 2023-06-02 VITALS
BODY MASS INDEX: 49.68 KG/M2 | SYSTOLIC BLOOD PRESSURE: 110 MMHG | DIASTOLIC BLOOD PRESSURE: 60 MMHG | WEIGHT: 280.44 LBS

## 2023-06-02 DIAGNOSIS — O99.210 OBESITY IN PREGNANCY: Primary | ICD-10-CM

## 2023-06-02 DIAGNOSIS — Z3A.30 30 WEEKS GESTATION OF PREGNANCY: ICD-10-CM

## 2023-06-02 PROCEDURE — 99999 PR PBB SHADOW E&M-EST. PATIENT-LVL II: CPT | Mod: PBBFAC,,, | Performed by: MIDWIFE

## 2023-06-02 PROCEDURE — 99213 OFFICE O/P EST LOW 20 MIN: CPT | Mod: TH,S$PBB,, | Performed by: MIDWIFE

## 2023-06-02 PROCEDURE — 99999 PR PBB SHADOW E&M-EST. PATIENT-LVL II: ICD-10-PCS | Mod: PBBFAC,,, | Performed by: MIDWIFE

## 2023-06-02 PROCEDURE — 99212 OFFICE O/P EST SF 10 MIN: CPT | Mod: PBBFAC,TH | Performed by: MIDWIFE

## 2023-06-02 PROCEDURE — 99213 PR OFFICE/OUTPT VISIT, EST, LEVL III, 20-29 MIN: ICD-10-PCS | Mod: TH,S$PBB,, | Performed by: MIDWIFE

## 2023-06-02 NOTE — PROGRESS NOTES
32 y.o. female  at 30w6d   Reports + FM, denies VB, LOF or regular CTX  Doing well without concerns   Reviewed T3 labs, passed 3 hour  Pt flying to [place]  TW lbs   Reviewed warning signs, normal FKCs, labor precautions and how/when to call.  RTC x 2 wks, start weekly scans call or present sooner prn.

## 2023-06-10 ENCOUNTER — PATIENT MESSAGE (OUTPATIENT)
Dept: OTHER | Facility: OTHER | Age: 33
End: 2023-06-10
Payer: MEDICAID

## 2023-06-14 ENCOUNTER — ROUTINE PRENATAL (OUTPATIENT)
Dept: OBSTETRICS AND GYNECOLOGY | Facility: CLINIC | Age: 33
End: 2023-06-14
Payer: MEDICAID

## 2023-06-14 ENCOUNTER — PROCEDURE VISIT (OUTPATIENT)
Dept: OBSTETRICS AND GYNECOLOGY | Facility: CLINIC | Age: 33
End: 2023-06-14
Payer: MEDICAID

## 2023-06-14 VITALS
DIASTOLIC BLOOD PRESSURE: 72 MMHG | SYSTOLIC BLOOD PRESSURE: 104 MMHG | BODY MASS INDEX: 50.03 KG/M2 | WEIGHT: 282.44 LBS

## 2023-06-14 DIAGNOSIS — O99.210 OBESITY IN PREGNANCY: Primary | ICD-10-CM

## 2023-06-14 DIAGNOSIS — O99.210 OBESITY IN PREGNANCY: ICD-10-CM

## 2023-06-14 DIAGNOSIS — Z3A.32 32 WEEKS GESTATION OF PREGNANCY: ICD-10-CM

## 2023-06-14 PROCEDURE — 99999 PR PBB SHADOW E&M-EST. PATIENT-LVL II: ICD-10-PCS | Mod: PBBFAC,,, | Performed by: MIDWIFE

## 2023-06-14 PROCEDURE — 99214 OFFICE O/P EST MOD 30 MIN: CPT | Mod: TH,S$PBB,, | Performed by: MIDWIFE

## 2023-06-14 PROCEDURE — 99214 PR OFFICE/OUTPT VISIT, EST, LEVL IV, 30-39 MIN: ICD-10-PCS | Mod: TH,S$PBB,, | Performed by: MIDWIFE

## 2023-06-14 PROCEDURE — 76819 FETAL BIOPHYS PROFIL W/O NST: CPT | Mod: 26,S$PBB,, | Performed by: OBSTETRICS & GYNECOLOGY

## 2023-06-14 PROCEDURE — 99212 OFFICE O/P EST SF 10 MIN: CPT | Mod: PBBFAC,TH,25 | Performed by: MIDWIFE

## 2023-06-14 PROCEDURE — 76816 OB US FOLLOW-UP PER FETUS: CPT | Mod: PBBFAC | Performed by: OBSTETRICS & GYNECOLOGY

## 2023-06-14 PROCEDURE — 76816 US OB/GYN PROCEDURE (VIEWPOINT): ICD-10-PCS | Mod: 26,S$PBB,, | Performed by: OBSTETRICS & GYNECOLOGY

## 2023-06-14 PROCEDURE — 76819 US OB/GYN PROCEDURE (VIEWPOINT): ICD-10-PCS | Mod: 26,S$PBB,, | Performed by: OBSTETRICS & GYNECOLOGY

## 2023-06-14 PROCEDURE — 99999 PR PBB SHADOW E&M-EST. PATIENT-LVL II: CPT | Mod: PBBFAC,,, | Performed by: MIDWIFE

## 2023-06-14 NOTE — PROGRESS NOTES
32 y.o. female  at 32w4d   Reports + FM, denies VB, LOF or regular CTX  Doing well without concerns   TW lbs   Obesity during pregnancy, third trimester   -Weekly BPP , EFW 60%, 5# 10 oz, MVP 7.0  Reviewed warning signs, normal FKCs, labor precautions and how/when to call.  RTC x 1 wks, call or present sooner prn.

## 2023-06-20 ENCOUNTER — ROUTINE PRENATAL (OUTPATIENT)
Dept: OBSTETRICS AND GYNECOLOGY | Facility: CLINIC | Age: 33
End: 2023-06-20
Payer: MEDICAID

## 2023-06-20 ENCOUNTER — PROCEDURE VISIT (OUTPATIENT)
Dept: OBSTETRICS AND GYNECOLOGY | Facility: CLINIC | Age: 33
End: 2023-06-20
Payer: MEDICAID

## 2023-06-20 VITALS — DIASTOLIC BLOOD PRESSURE: 68 MMHG | WEIGHT: 281.5 LBS | SYSTOLIC BLOOD PRESSURE: 110 MMHG | BODY MASS INDEX: 49.87 KG/M2

## 2023-06-20 DIAGNOSIS — O99.210 OBESITY IN PREGNANCY: Primary | ICD-10-CM

## 2023-06-20 DIAGNOSIS — O99.210 OBESITY IN PREGNANCY: ICD-10-CM

## 2023-06-20 DIAGNOSIS — Z3A.33 33 WEEKS GESTATION OF PREGNANCY: ICD-10-CM

## 2023-06-20 PROCEDURE — 99999 PR PBB SHADOW E&M-EST. PATIENT-LVL II: CPT | Mod: PBBFAC,,, | Performed by: MIDWIFE

## 2023-06-20 PROCEDURE — 99214 OFFICE O/P EST MOD 30 MIN: CPT | Mod: TH,S$PBB,, | Performed by: MIDWIFE

## 2023-06-20 PROCEDURE — 99999 PR PBB SHADOW E&M-EST. PATIENT-LVL II: ICD-10-PCS | Mod: PBBFAC,,, | Performed by: MIDWIFE

## 2023-06-20 PROCEDURE — 76819 US OB/GYN PROCEDURE (VIEWPOINT): ICD-10-PCS | Mod: 26,S$PBB,, | Performed by: OBSTETRICS & GYNECOLOGY

## 2023-06-20 PROCEDURE — 99212 OFFICE O/P EST SF 10 MIN: CPT | Mod: PBBFAC,TH,25 | Performed by: MIDWIFE

## 2023-06-20 PROCEDURE — 99214 PR OFFICE/OUTPT VISIT, EST, LEVL IV, 30-39 MIN: ICD-10-PCS | Mod: TH,S$PBB,, | Performed by: MIDWIFE

## 2023-06-20 PROCEDURE — 76819 FETAL BIOPHYS PROFIL W/O NST: CPT | Mod: PBBFAC | Performed by: OBSTETRICS & GYNECOLOGY

## 2023-06-20 NOTE — PROGRESS NOTES
32 y.o. female  at 33w3d   Reports + FM, denies VB, LOF or CTX  Doing well without concerns   TW lbs   Obesity during pregnancy, third trimester              -Weekly BPP /, MVP 7.8  Reviewed warning signs, normal FKCs,  labor precautions and how/when to call.  RTC x 1 wks, call or present sooner prn.

## 2023-06-28 NOTE — NURSING
LVM for pt to return call and let him know provider response      MOTHER HAS [facility]  MOTHER BABY CARE AND BREAST FEEDING AND BOTTLE FEEDING BOOKS

## 2023-06-29 ENCOUNTER — PROCEDURE VISIT (OUTPATIENT)
Dept: OBSTETRICS AND GYNECOLOGY | Facility: CLINIC | Age: 33
End: 2023-06-29
Payer: MEDICAID

## 2023-06-29 ENCOUNTER — ROUTINE PRENATAL (OUTPATIENT)
Dept: OBSTETRICS AND GYNECOLOGY | Facility: CLINIC | Age: 33
End: 2023-06-29
Payer: MEDICAID

## 2023-06-29 VITALS
BODY MASS INDEX: 50.03 KG/M2 | SYSTOLIC BLOOD PRESSURE: 116 MMHG | DIASTOLIC BLOOD PRESSURE: 64 MMHG | WEIGHT: 282.44 LBS

## 2023-06-29 DIAGNOSIS — O99.210 OBESITY IN PREGNANCY: Primary | ICD-10-CM

## 2023-06-29 DIAGNOSIS — Z3A.34 34 WEEKS GESTATION OF PREGNANCY: ICD-10-CM

## 2023-06-29 DIAGNOSIS — O99.210 OBESITY IN PREGNANCY: ICD-10-CM

## 2023-06-29 PROCEDURE — 99212 OFFICE O/P EST SF 10 MIN: CPT | Mod: PBBFAC,TH,25 | Performed by: MIDWIFE

## 2023-06-29 PROCEDURE — 99999 PR PBB SHADOW E&M-EST. PATIENT-LVL II: ICD-10-PCS | Mod: PBBFAC,,, | Performed by: MIDWIFE

## 2023-06-29 PROCEDURE — 76819 FETAL BIOPHYS PROFIL W/O NST: CPT | Mod: PBBFAC | Performed by: OBSTETRICS & GYNECOLOGY

## 2023-06-29 PROCEDURE — 76819 US OB/GYN PROCEDURE (VIEWPOINT): ICD-10-PCS | Mod: 26,S$PBB,, | Performed by: OBSTETRICS & GYNECOLOGY

## 2023-06-29 PROCEDURE — 99999 PR PBB SHADOW E&M-EST. PATIENT-LVL II: CPT | Mod: PBBFAC,,, | Performed by: MIDWIFE

## 2023-06-29 PROCEDURE — 99214 PR OFFICE/OUTPT VISIT, EST, LEVL IV, 30-39 MIN: ICD-10-PCS | Mod: 25,TH,S$PBB, | Performed by: MIDWIFE

## 2023-06-29 PROCEDURE — 99214 OFFICE O/P EST MOD 30 MIN: CPT | Mod: 25,TH,S$PBB, | Performed by: MIDWIFE

## 2023-06-29 NOTE — PROGRESS NOTES
32 y.o. female  at 34w5d   Reports + FM, denies VB, LOF or regular CTX  Doing well without concerns   Obesity during pregnancy, third trimester   -BPP 8/, MVP 5.5  TW lbs   Reviewed warning signs, normal FKCs, labor precautions and how/when to call.  RTC x 1 wks, call or present sooner prn.

## 2023-07-01 ENCOUNTER — PATIENT MESSAGE (OUTPATIENT)
Dept: OTHER | Facility: OTHER | Age: 33
End: 2023-07-01
Payer: MEDICAID

## 2023-07-06 ENCOUNTER — ROUTINE PRENATAL (OUTPATIENT)
Dept: OBSTETRICS AND GYNECOLOGY | Facility: CLINIC | Age: 33
End: 2023-07-06
Payer: MEDICAID

## 2023-07-06 ENCOUNTER — PROCEDURE VISIT (OUTPATIENT)
Dept: OBSTETRICS AND GYNECOLOGY | Facility: CLINIC | Age: 33
End: 2023-07-06
Payer: MEDICAID

## 2023-07-06 VITALS
BODY MASS INDEX: 50.46 KG/M2 | SYSTOLIC BLOOD PRESSURE: 116 MMHG | DIASTOLIC BLOOD PRESSURE: 68 MMHG | WEIGHT: 284.81 LBS

## 2023-07-06 DIAGNOSIS — Z3A.35 35 WEEKS GESTATION OF PREGNANCY: ICD-10-CM

## 2023-07-06 DIAGNOSIS — O99.210 OBESITY IN PREGNANCY: ICD-10-CM

## 2023-07-06 DIAGNOSIS — O99.210 OBESITY IN PREGNANCY: Primary | ICD-10-CM

## 2023-07-06 PROCEDURE — 99214 PR OFFICE/OUTPT VISIT, EST, LEVL IV, 30-39 MIN: ICD-10-PCS | Mod: TH,S$PBB,25, | Performed by: MIDWIFE

## 2023-07-06 PROCEDURE — 99212 OFFICE O/P EST SF 10 MIN: CPT | Mod: PBBFAC,TH,25 | Performed by: MIDWIFE

## 2023-07-06 PROCEDURE — 99999 PR PBB SHADOW E&M-EST. PATIENT-LVL II: CPT | Mod: PBBFAC,,, | Performed by: MIDWIFE

## 2023-07-06 PROCEDURE — 99214 OFFICE O/P EST MOD 30 MIN: CPT | Mod: TH,S$PBB,25, | Performed by: MIDWIFE

## 2023-07-06 PROCEDURE — 76819 US OB/GYN PROCEDURE (VIEWPOINT): ICD-10-PCS | Mod: 26,S$PBB,, | Performed by: OBSTETRICS & GYNECOLOGY

## 2023-07-06 PROCEDURE — 99999 PR PBB SHADOW E&M-EST. PATIENT-LVL II: ICD-10-PCS | Mod: PBBFAC,,, | Performed by: MIDWIFE

## 2023-07-06 PROCEDURE — 76819 FETAL BIOPHYS PROFIL W/O NST: CPT | Mod: PBBFAC | Performed by: OBSTETRICS & GYNECOLOGY

## 2023-07-06 NOTE — PROGRESS NOTES
32 y.o. female  at 35w5d   Reports + FM, denies VB, LOF or regular CTX  Doing well without concerns   TW lbs   Obesity during pregnancy, third trimester              -BPP 8/, MVP 6.0  GBS at [place]  Reviewed warning signs, normal FKCs, labor precautions and how/when to call.  RTC x 1 wks, call or present sooner prn.

## 2023-07-14 ENCOUNTER — PROCEDURE VISIT (OUTPATIENT)
Dept: OBSTETRICS AND GYNECOLOGY | Facility: CLINIC | Age: 33
End: 2023-07-14
Payer: MEDICAID

## 2023-07-14 ENCOUNTER — ROUTINE PRENATAL (OUTPATIENT)
Dept: OBSTETRICS AND GYNECOLOGY | Facility: CLINIC | Age: 33
End: 2023-07-14
Payer: MEDICAID

## 2023-07-14 VITALS
BODY MASS INDEX: 50.85 KG/M2 | DIASTOLIC BLOOD PRESSURE: 74 MMHG | SYSTOLIC BLOOD PRESSURE: 120 MMHG | WEIGHT: 287.06 LBS

## 2023-07-14 DIAGNOSIS — O99.210 OBESITY IN PREGNANCY: Primary | ICD-10-CM

## 2023-07-14 DIAGNOSIS — Z3A.36 36 WEEKS GESTATION OF PREGNANCY: ICD-10-CM

## 2023-07-14 DIAGNOSIS — O99.210 OBESITY IN PREGNANCY: ICD-10-CM

## 2023-07-14 PROCEDURE — 99999 PR PBB SHADOW E&M-EST. PATIENT-LVL II: CPT | Mod: PBBFAC,,, | Performed by: MIDWIFE

## 2023-07-14 PROCEDURE — 76816 OB US FOLLOW-UP PER FETUS: CPT | Mod: PBBFAC | Performed by: OBSTETRICS & GYNECOLOGY

## 2023-07-14 PROCEDURE — 99212 OFFICE O/P EST SF 10 MIN: CPT | Mod: PBBFAC,25,TH | Performed by: MIDWIFE

## 2023-07-14 PROCEDURE — 76819 US OB/GYN PROCEDURE (VIEWPOINT): ICD-10-PCS | Mod: 26,S$PBB,, | Performed by: OBSTETRICS & GYNECOLOGY

## 2023-07-14 PROCEDURE — 76816 US OB/GYN PROCEDURE (VIEWPOINT): ICD-10-PCS | Mod: 26,S$PBB,, | Performed by: OBSTETRICS & GYNECOLOGY

## 2023-07-14 PROCEDURE — 87081 CULTURE SCREEN ONLY: CPT | Performed by: MIDWIFE

## 2023-07-14 PROCEDURE — 99999 PR PBB SHADOW E&M-EST. PATIENT-LVL II: ICD-10-PCS | Mod: PBBFAC,,, | Performed by: MIDWIFE

## 2023-07-14 PROCEDURE — 99214 PR OFFICE/OUTPT VISIT, EST, LEVL IV, 30-39 MIN: ICD-10-PCS | Mod: TH,S$PBB,, | Performed by: MIDWIFE

## 2023-07-14 PROCEDURE — 99214 OFFICE O/P EST MOD 30 MIN: CPT | Mod: TH,S$PBB,, | Performed by: MIDWIFE

## 2023-07-14 PROCEDURE — 76819 FETAL BIOPHYS PROFIL W/O NST: CPT | Mod: 26,S$PBB,, | Performed by: OBSTETRICS & GYNECOLOGY

## 2023-07-18 LAB — BACTERIA SPEC AEROBE CULT: NORMAL

## 2023-07-19 NOTE — PROGRESS NOTES
32 y.o. female  at 36w6d   Reports + FM, denies VB, LOF or regular CTX  Doing well without concerns   Obesity during pregnancy, third trimester    -BPP 8/, MVP 4.9, EFW 83%, 3578 g  GBS today  TW lbs   Reviewed warning signs, normal FKCs, labor precautions and how/when to call.  RTC x 1 wks, call or present sooner prn.

## 2023-07-20 ENCOUNTER — ROUTINE PRENATAL (OUTPATIENT)
Dept: OBSTETRICS AND GYNECOLOGY | Facility: CLINIC | Age: 33
End: 2023-07-20
Payer: MEDICAID

## 2023-07-20 ENCOUNTER — PROCEDURE VISIT (OUTPATIENT)
Dept: OBSTETRICS AND GYNECOLOGY | Facility: CLINIC | Age: 33
End: 2023-07-20
Payer: MEDICAID

## 2023-07-20 VITALS
DIASTOLIC BLOOD PRESSURE: 82 MMHG | WEIGHT: 290.38 LBS | SYSTOLIC BLOOD PRESSURE: 122 MMHG | BODY MASS INDEX: 51.43 KG/M2

## 2023-07-20 DIAGNOSIS — O99.210 OBESITY IN PREGNANCY: Primary | ICD-10-CM

## 2023-07-20 DIAGNOSIS — O99.210 OBESITY IN PREGNANCY: ICD-10-CM

## 2023-07-20 PROCEDURE — 99214 OFFICE O/P EST MOD 30 MIN: CPT | Mod: TH,S$PBB,, | Performed by: MIDWIFE

## 2023-07-20 PROCEDURE — 99999 PR PBB SHADOW E&M-EST. PATIENT-LVL II: CPT | Mod: PBBFAC,,, | Performed by: MIDWIFE

## 2023-07-20 PROCEDURE — 99212 OFFICE O/P EST SF 10 MIN: CPT | Mod: PBBFAC,TH | Performed by: MIDWIFE

## 2023-07-20 PROCEDURE — 99999 PR PBB SHADOW E&M-EST. PATIENT-LVL II: ICD-10-PCS | Mod: PBBFAC,,, | Performed by: MIDWIFE

## 2023-07-20 PROCEDURE — 76819 US OB/GYN PROCEDURE (VIEWPOINT): ICD-10-PCS | Mod: 26,S$PBB,, | Performed by: OBSTETRICS & GYNECOLOGY

## 2023-07-20 PROCEDURE — 99214 PR OFFICE/OUTPT VISIT, EST, LEVL IV, 30-39 MIN: ICD-10-PCS | Mod: TH,S$PBB,, | Performed by: MIDWIFE

## 2023-07-20 PROCEDURE — 76819 FETAL BIOPHYS PROFIL W/O NST: CPT | Mod: PBBFAC | Performed by: OBSTETRICS & GYNECOLOGY

## 2023-07-20 NOTE — PROGRESS NOTES
32 y.o. female  at 37w5d   Reports + FM, denies VB, LOF or regular CTX  Doing well without concerns   TW lbs   Obesity during pregnancy, third trimester               -BPP /, MVP 5.0  Reviewed warning signs, normal FKCs, labor precautions and how/when to call.  RTC x 1 wks, call or present sooner prn.

## 2023-07-27 ENCOUNTER — ROUTINE PRENATAL (OUTPATIENT)
Dept: OBSTETRICS AND GYNECOLOGY | Facility: CLINIC | Age: 33
End: 2023-07-27
Payer: MEDICAID

## 2023-07-27 ENCOUNTER — PROCEDURE VISIT (OUTPATIENT)
Dept: OBSTETRICS AND GYNECOLOGY | Facility: CLINIC | Age: 33
End: 2023-07-27
Payer: MEDICAID

## 2023-07-27 VITALS
WEIGHT: 288.81 LBS | SYSTOLIC BLOOD PRESSURE: 128 MMHG | DIASTOLIC BLOOD PRESSURE: 82 MMHG | BODY MASS INDEX: 51.16 KG/M2

## 2023-07-27 DIAGNOSIS — O99.210 OBESITY IN PREGNANCY: Primary | ICD-10-CM

## 2023-07-27 DIAGNOSIS — Z3A.38 38 WEEKS GESTATION OF PREGNANCY: ICD-10-CM

## 2023-07-27 DIAGNOSIS — O99.210 OBESITY IN PREGNANCY: ICD-10-CM

## 2023-07-27 PROCEDURE — 99999 PR PBB SHADOW E&M-EST. PATIENT-LVL II: CPT | Mod: PBBFAC,,, | Performed by: MIDWIFE

## 2023-07-27 PROCEDURE — 76819 US OB/GYN PROCEDURE (VIEWPOINT): ICD-10-PCS | Mod: 26,S$PBB,, | Performed by: OBSTETRICS & GYNECOLOGY

## 2023-07-27 PROCEDURE — 99214 PR OFFICE/OUTPT VISIT, EST, LEVL IV, 30-39 MIN: ICD-10-PCS | Mod: 25,TH,S$PBB, | Performed by: MIDWIFE

## 2023-07-27 PROCEDURE — 99212 OFFICE O/P EST SF 10 MIN: CPT | Mod: PBBFAC,TH,25 | Performed by: MIDWIFE

## 2023-07-27 PROCEDURE — 99999 PR PBB SHADOW E&M-EST. PATIENT-LVL II: ICD-10-PCS | Mod: PBBFAC,,, | Performed by: MIDWIFE

## 2023-07-27 PROCEDURE — 99214 OFFICE O/P EST MOD 30 MIN: CPT | Mod: 25,TH,S$PBB, | Performed by: MIDWIFE

## 2023-07-27 PROCEDURE — 76819 FETAL BIOPHYS PROFIL W/O NST: CPT | Mod: PBBFAC | Performed by: OBSTETRICS & GYNECOLOGY

## 2023-07-27 NOTE — PROGRESS NOTES
32 y.o. female  at 38w5d   Reports + FM, denies VB, LOF or regular CTX  Doing well without concerns   TWG: 15 lbs   Obesity during pregnancy, third trimester   -BPP 8/, MVP 5.2  Reviewed warning signs, normal FKCs, labor precautions and how/when to call.  RTC x 1 wks, call or present sooner prn.

## 2023-08-01 ENCOUNTER — PATIENT MESSAGE (OUTPATIENT)
Dept: OBSTETRICS AND GYNECOLOGY | Facility: CLINIC | Age: 33
End: 2023-08-01

## 2023-08-01 ENCOUNTER — ROUTINE PRENATAL (OUTPATIENT)
Dept: OBSTETRICS AND GYNECOLOGY | Facility: CLINIC | Age: 33
End: 2023-08-01
Payer: MEDICAID

## 2023-08-01 VITALS
DIASTOLIC BLOOD PRESSURE: 76 MMHG | WEIGHT: 292.31 LBS | BODY MASS INDEX: 51.78 KG/M2 | SYSTOLIC BLOOD PRESSURE: 116 MMHG

## 2023-08-01 DIAGNOSIS — Z3A.39 39 WEEKS GESTATION OF PREGNANCY: ICD-10-CM

## 2023-08-01 DIAGNOSIS — O99.210 OBESITY IN PREGNANCY: Primary | ICD-10-CM

## 2023-08-01 PROCEDURE — 99212 OFFICE O/P EST SF 10 MIN: CPT | Mod: PBBFAC,TH | Performed by: MIDWIFE

## 2023-08-01 PROCEDURE — 99999 PR PBB SHADOW E&M-EST. PATIENT-LVL II: ICD-10-PCS | Mod: PBBFAC,,, | Performed by: MIDWIFE

## 2023-08-01 PROCEDURE — 99214 PR OFFICE/OUTPT VISIT, EST, LEVL IV, 30-39 MIN: ICD-10-PCS | Mod: TH,S$PBB,, | Performed by: MIDWIFE

## 2023-08-01 PROCEDURE — 99214 OFFICE O/P EST MOD 30 MIN: CPT | Mod: TH,S$PBB,, | Performed by: MIDWIFE

## 2023-08-01 PROCEDURE — 99999 PR PBB SHADOW E&M-EST. PATIENT-LVL II: CPT | Mod: PBBFAC,,, | Performed by: MIDWIFE

## 2023-08-01 NOTE — PROGRESS NOTES
32 y.o. female  at 39w3d   Reports + FM, denies VB, LOF or regular CTX  Doing well without concerns   TW lbs   Obesity during pregnancy, third trimester  Pt desires Elective IOL, scheduled for 8/3/23 @ 0800 hrs  Reviewed warning signs, normal FKCs, labor precautions and how/when to call.  RTC x 4 wks PP, call or present sooner prn.

## 2023-08-02 ENCOUNTER — ANESTHESIA EVENT (OUTPATIENT)
Dept: OBSTETRICS AND GYNECOLOGY | Facility: HOSPITAL | Age: 33
End: 2023-08-02
Payer: MEDICAID

## 2023-08-02 ENCOUNTER — HOSPITAL ENCOUNTER (INPATIENT)
Facility: HOSPITAL | Age: 33
LOS: 2 days | Discharge: HOME | End: 2023-08-04
Attending: OBSTETRICS & GYNECOLOGY | Admitting: OBSTETRICS & GYNECOLOGY
Payer: MEDICAID

## 2023-08-02 ENCOUNTER — ANESTHESIA (OUTPATIENT)
Dept: OBSTETRICS AND GYNECOLOGY | Facility: HOSPITAL | Age: 33
End: 2023-08-02
Payer: MEDICAID

## 2023-08-02 DIAGNOSIS — Z37.9 NORMAL LABOR: Primary | ICD-10-CM

## 2023-08-02 LAB
ABO + RH BLD: NORMAL
ALBUMIN SERPL BCP-MCNC: 2.6 G/DL (ref 3.5–5.2)
ALP SERPL-CCNC: 134 U/L (ref 55–135)
ALT SERPL W/O P-5'-P-CCNC: 16 U/L (ref 10–44)
ANION GAP SERPL CALC-SCNC: 10 MMOL/L (ref 8–16)
AST SERPL-CCNC: 23 U/L (ref 10–40)
BASOPHILS # BLD AUTO: 0.05 K/UL (ref 0–0.2)
BASOPHILS # BLD AUTO: 0.06 K/UL (ref 0–0.2)
BASOPHILS NFR BLD: 0.3 % (ref 0–1.9)
BASOPHILS NFR BLD: 0.5 % (ref 0–1.9)
BILIRUB SERPL-MCNC: 0.2 MG/DL (ref 0.1–1)
BLD GP AB SCN CELLS X3 SERPL QL: NORMAL
BUN SERPL-MCNC: 10 MG/DL (ref 6–20)
CALCIUM SERPL-MCNC: 9.2 MG/DL (ref 8.7–10.5)
CHLORIDE SERPL-SCNC: 109 MMOL/L (ref 95–110)
CO2 SERPL-SCNC: 18 MMOL/L (ref 23–29)
CREAT SERPL-MCNC: 0.7 MG/DL (ref 0.5–1.4)
DIFFERENTIAL METHOD: ABNORMAL
DIFFERENTIAL METHOD: ABNORMAL
EOSINOPHIL # BLD AUTO: 0 K/UL (ref 0–0.5)
EOSINOPHIL # BLD AUTO: 0.1 K/UL (ref 0–0.5)
EOSINOPHIL NFR BLD: 0.1 % (ref 0–8)
EOSINOPHIL NFR BLD: 0.9 % (ref 0–8)
ERYTHROCYTE [DISTWIDTH] IN BLOOD BY AUTOMATED COUNT: 14.5 % (ref 11.5–14.5)
ERYTHROCYTE [DISTWIDTH] IN BLOOD BY AUTOMATED COUNT: 14.6 % (ref 11.5–14.5)
EST. GFR  (NO RACE VARIABLE): >60 ML/MIN/1.73 M^2
GLUCOSE SERPL-MCNC: 98 MG/DL (ref 70–110)
HCT VFR BLD AUTO: 37.1 % (ref 37–48.5)
HCT VFR BLD AUTO: 39.2 % (ref 37–48.5)
HGB BLD-MCNC: 12.3 G/DL (ref 12–16)
HGB BLD-MCNC: 13.1 G/DL (ref 12–16)
IMM GRANULOCYTES # BLD AUTO: 0.05 K/UL (ref 0–0.04)
IMM GRANULOCYTES # BLD AUTO: 0.16 K/UL (ref 0–0.04)
IMM GRANULOCYTES NFR BLD AUTO: 0.5 % (ref 0–0.5)
IMM GRANULOCYTES NFR BLD AUTO: 0.8 % (ref 0–0.5)
LYMPHOCYTES # BLD AUTO: 3.1 K/UL (ref 1–4.8)
LYMPHOCYTES # BLD AUTO: 3.9 K/UL (ref 1–4.8)
LYMPHOCYTES NFR BLD: 18.8 % (ref 18–48)
LYMPHOCYTES NFR BLD: 30 % (ref 18–48)
MCH RBC QN AUTO: 29.3 PG (ref 27–31)
MCH RBC QN AUTO: 29.7 PG (ref 27–31)
MCHC RBC AUTO-ENTMCNC: 33.2 G/DL (ref 32–36)
MCHC RBC AUTO-ENTMCNC: 33.4 G/DL (ref 32–36)
MCV RBC AUTO: 88 FL (ref 82–98)
MCV RBC AUTO: 89 FL (ref 82–98)
MONOCYTES # BLD AUTO: 0.8 K/UL (ref 0.3–1)
MONOCYTES # BLD AUTO: 1.2 K/UL (ref 0.3–1)
MONOCYTES NFR BLD: 5.7 % (ref 4–15)
MONOCYTES NFR BLD: 7.9 % (ref 4–15)
NEUTROPHILS # BLD AUTO: 15.2 K/UL (ref 1.8–7.7)
NEUTROPHILS # BLD AUTO: 6.2 K/UL (ref 1.8–7.7)
NEUTROPHILS NFR BLD: 60.2 % (ref 38–73)
NEUTROPHILS NFR BLD: 74.3 % (ref 38–73)
NRBC BLD-RTO: 0 /100 WBC
NRBC BLD-RTO: 0 /100 WBC
PLATELET # BLD AUTO: 172 K/UL (ref 150–450)
PLATELET # BLD AUTO: 180 K/UL (ref 150–450)
PMV BLD AUTO: 12.5 FL (ref 9.2–12.9)
PMV BLD AUTO: 12.5 FL (ref 9.2–12.9)
POTASSIUM SERPL-SCNC: 4.3 MMOL/L (ref 3.5–5.1)
PROT SERPL-MCNC: 7.1 G/DL (ref 6–8.4)
RBC # BLD AUTO: 4.2 M/UL (ref 4–5.4)
RBC # BLD AUTO: 4.41 M/UL (ref 4–5.4)
SODIUM SERPL-SCNC: 137 MMOL/L (ref 136–145)
SPECIMEN OUTDATE: NORMAL
WBC # BLD AUTO: 10.35 K/UL (ref 3.9–12.7)
WBC # BLD AUTO: 20.5 K/UL (ref 3.9–12.7)

## 2023-08-02 PROCEDURE — 99900035 HC TECH TIME PER 15 MIN (STAT)

## 2023-08-02 PROCEDURE — 72100002 HC LABOR CARE, 1ST 8 HOURS

## 2023-08-02 PROCEDURE — 80053 COMPREHEN METABOLIC PANEL: CPT | Performed by: ADVANCED PRACTICE MIDWIFE

## 2023-08-02 PROCEDURE — 72100003 HC LABOR CARE, EA. ADDL. 8 HRS

## 2023-08-02 PROCEDURE — 99211 OFF/OP EST MAY X REQ PHY/QHP: CPT | Mod: 25

## 2023-08-02 PROCEDURE — 85025 COMPLETE CBC W/AUTO DIFF WBC: CPT | Performed by: ADVANCED PRACTICE MIDWIFE

## 2023-08-02 PROCEDURE — 36415 COLL VENOUS BLD VENIPUNCTURE: CPT | Performed by: ADVANCED PRACTICE MIDWIFE

## 2023-08-02 PROCEDURE — 25000003 PHARM REV CODE 250: Performed by: ADVANCED PRACTICE MIDWIFE

## 2023-08-02 PROCEDURE — 63600175 PHARM REV CODE 636 W HCPCS: Performed by: NURSE ANESTHETIST, CERTIFIED REGISTERED

## 2023-08-02 PROCEDURE — 63600175 PHARM REV CODE 636 W HCPCS: Performed by: ADVANCED PRACTICE MIDWIFE

## 2023-08-02 PROCEDURE — 86900 BLOOD TYPING SEROLOGIC ABO: CPT | Performed by: ADVANCED PRACTICE MIDWIFE

## 2023-08-02 PROCEDURE — 27800516 HC TRAY, EPIDURAL COMBO: Performed by: ANESTHESIOLOGY

## 2023-08-02 PROCEDURE — 72200006 HC VAGINAL DELIVERY LEVEL III

## 2023-08-02 PROCEDURE — 27000181 HC CABLE, IUPC

## 2023-08-02 PROCEDURE — 59025 FETAL NON-STRESS TEST: CPT

## 2023-08-02 PROCEDURE — 51702 INSERT TEMP BLADDER CATH: CPT

## 2023-08-02 PROCEDURE — 59409 PR OBSTETRICAL CARE,VAG DELIV ONLY: ICD-10-PCS | Mod: GB,,, | Performed by: ADVANCED PRACTICE MIDWIFE

## 2023-08-02 PROCEDURE — 27200710 HC EPIDURAL INFUSION PUMP SET: Performed by: ANESTHESIOLOGY

## 2023-08-02 PROCEDURE — 72200005 HC VAGINAL DELIVERY LEVEL II

## 2023-08-02 PROCEDURE — 11000001 HC ACUTE MED/SURG PRIVATE ROOM

## 2023-08-02 PROCEDURE — 59409 OBSTETRICAL CARE: CPT | Mod: GB,,, | Performed by: ADVANCED PRACTICE MIDWIFE

## 2023-08-02 PROCEDURE — 62326 NJX INTERLAMINAR LMBR/SAC: CPT | Performed by: NURSE ANESTHETIST, CERTIFIED REGISTERED

## 2023-08-02 RX ORDER — HYDROCODONE BITARTRATE AND ACETAMINOPHEN 5; 325 MG/1; MG/1
1 TABLET ORAL EVERY 4 HOURS PRN
Status: DISCONTINUED | OUTPATIENT
Start: 2023-08-02 | End: 2023-08-04 | Stop reason: HOSPADM

## 2023-08-02 RX ORDER — CALCIUM CARBONATE 200(500)MG
500 TABLET,CHEWABLE ORAL 3 TIMES DAILY PRN
Status: DISCONTINUED | OUTPATIENT
Start: 2023-08-02 | End: 2023-08-02

## 2023-08-02 RX ORDER — PROCHLORPERAZINE EDISYLATE 5 MG/ML
5 INJECTION INTRAMUSCULAR; INTRAVENOUS EVERY 6 HOURS PRN
Status: DISCONTINUED | OUTPATIENT
Start: 2023-08-02 | End: 2023-08-04 | Stop reason: HOSPADM

## 2023-08-02 RX ORDER — ENOXAPARIN SODIUM 100 MG/ML
60 INJECTION SUBCUTANEOUS EVERY 12 HOURS
Status: DISCONTINUED | OUTPATIENT
Start: 2023-08-03 | End: 2023-08-02

## 2023-08-02 RX ORDER — METHYLERGONOVINE MALEATE 0.2 MG/ML
200 INJECTION INTRAVENOUS
Status: DISCONTINUED | OUTPATIENT
Start: 2023-08-02 | End: 2023-08-04 | Stop reason: HOSPADM

## 2023-08-02 RX ORDER — HYDROCODONE BITARTRATE AND ACETAMINOPHEN 7.5; 325 MG/1; MG/1
1 TABLET ORAL EVERY 4 HOURS PRN
Status: DISCONTINUED | OUTPATIENT
Start: 2023-08-02 | End: 2023-08-04 | Stop reason: HOSPADM

## 2023-08-02 RX ORDER — OXYTOCIN/RINGER'S LACTATE 30/500 ML
334 PLASTIC BAG, INJECTION (ML) INTRAVENOUS ONCE AS NEEDED
Status: COMPLETED | OUTPATIENT
Start: 2023-08-02 | End: 2023-08-02

## 2023-08-02 RX ORDER — METHYLERGONOVINE MALEATE 0.2 MG/ML
200 INJECTION INTRAVENOUS
Status: DISCONTINUED | OUTPATIENT
Start: 2023-08-02 | End: 2023-08-02

## 2023-08-02 RX ORDER — OXYTOCIN/RINGER'S LACTATE 30/500 ML
0-30 PLASTIC BAG, INJECTION (ML) INTRAVENOUS CONTINUOUS
Status: DISCONTINUED | OUTPATIENT
Start: 2023-08-02 | End: 2023-08-02

## 2023-08-02 RX ORDER — DIPHENHYDRAMINE HYDROCHLORIDE 50 MG/ML
25 INJECTION INTRAMUSCULAR; INTRAVENOUS EVERY 4 HOURS PRN
Status: DISCONTINUED | OUTPATIENT
Start: 2023-08-02 | End: 2023-08-04 | Stop reason: HOSPADM

## 2023-08-02 RX ORDER — SODIUM CHLORIDE 0.9 % (FLUSH) 0.9 %
10 SYRINGE (ML) INJECTION
Status: DISCONTINUED | OUTPATIENT
Start: 2023-08-02 | End: 2023-08-04 | Stop reason: HOSPADM

## 2023-08-02 RX ORDER — CARBOPROST TROMETHAMINE 250 UG/ML
250 INJECTION, SOLUTION INTRAMUSCULAR
Status: DISCONTINUED | OUTPATIENT
Start: 2023-08-02 | End: 2023-08-04 | Stop reason: HOSPADM

## 2023-08-02 RX ORDER — CARBOPROST TROMETHAMINE 250 UG/ML
250 INJECTION, SOLUTION INTRAMUSCULAR
Status: DISCONTINUED | OUTPATIENT
Start: 2023-08-02 | End: 2023-08-02

## 2023-08-02 RX ORDER — DIPHENHYDRAMINE HCL 25 MG
25 CAPSULE ORAL EVERY 4 HOURS PRN
Status: DISCONTINUED | OUTPATIENT
Start: 2023-08-02 | End: 2023-08-04 | Stop reason: HOSPADM

## 2023-08-02 RX ORDER — HYDROCORTISONE 25 MG/G
CREAM TOPICAL 3 TIMES DAILY PRN
Status: DISCONTINUED | OUTPATIENT
Start: 2023-08-02 | End: 2023-08-04 | Stop reason: HOSPADM

## 2023-08-02 RX ORDER — SODIUM CHLORIDE, SODIUM LACTATE, POTASSIUM CHLORIDE, CALCIUM CHLORIDE 600; 310; 30; 20 MG/100ML; MG/100ML; MG/100ML; MG/100ML
INJECTION, SOLUTION INTRAVENOUS CONTINUOUS
Status: DISCONTINUED | OUTPATIENT
Start: 2023-08-02 | End: 2023-08-02

## 2023-08-02 RX ORDER — ACETAMINOPHEN 325 MG/1
650 TABLET ORAL EVERY 6 HOURS PRN
Status: DISCONTINUED | OUTPATIENT
Start: 2023-08-02 | End: 2023-08-04 | Stop reason: HOSPADM

## 2023-08-02 RX ORDER — OXYTOCIN/RINGER'S LACTATE 30/500 ML
334 PLASTIC BAG, INJECTION (ML) INTRAVENOUS ONCE
Status: DISCONTINUED | OUTPATIENT
Start: 2023-08-02 | End: 2023-08-02

## 2023-08-02 RX ORDER — LIDOCAINE HYDROCHLORIDE 10 MG/ML
10 INJECTION, SOLUTION EPIDURAL; INFILTRATION; INTRACAUDAL; PERINEURAL ONCE AS NEEDED
Status: DISCONTINUED | OUTPATIENT
Start: 2023-08-02 | End: 2023-08-02

## 2023-08-02 RX ORDER — MISOPROSTOL 200 UG/1
800 TABLET ORAL ONCE AS NEEDED
Status: DISCONTINUED | OUTPATIENT
Start: 2023-08-02 | End: 2023-08-04 | Stop reason: HOSPADM

## 2023-08-02 RX ORDER — OXYTOCIN/RINGER'S LACTATE 30/500 ML
95 PLASTIC BAG, INJECTION (ML) INTRAVENOUS ONCE
Status: DISCONTINUED | OUTPATIENT
Start: 2023-08-02 | End: 2023-08-04 | Stop reason: HOSPADM

## 2023-08-02 RX ORDER — DOCUSATE SODIUM 100 MG/1
200 CAPSULE, LIQUID FILLED ORAL 2 TIMES DAILY PRN
Status: DISCONTINUED | OUTPATIENT
Start: 2023-08-02 | End: 2023-08-04 | Stop reason: HOSPADM

## 2023-08-02 RX ORDER — TRANEXAMIC ACID 10 MG/ML
1000 INJECTION, SOLUTION INTRAVENOUS ONCE AS NEEDED
Status: DISCONTINUED | OUTPATIENT
Start: 2023-08-02 | End: 2023-08-02

## 2023-08-02 RX ORDER — ONDANSETRON 8 MG/1
8 TABLET, ORALLY DISINTEGRATING ORAL EVERY 8 HOURS PRN
Status: DISCONTINUED | OUTPATIENT
Start: 2023-08-02 | End: 2023-08-04 | Stop reason: HOSPADM

## 2023-08-02 RX ORDER — ROPIVACAINE HYDROCHLORIDE 2 MG/ML
INJECTION, SOLUTION EPIDURAL; INFILTRATION CONTINUOUS PRN
Status: DISCONTINUED | OUTPATIENT
Start: 2023-08-02 | End: 2023-08-02

## 2023-08-02 RX ORDER — OXYTOCIN/RINGER'S LACTATE 30/500 ML
95 PLASTIC BAG, INJECTION (ML) INTRAVENOUS ONCE
Status: DISCONTINUED | OUTPATIENT
Start: 2023-08-02 | End: 2023-08-02

## 2023-08-02 RX ORDER — PRENATAL WITH FERROUS FUM AND FOLIC ACID 3080; 920; 120; 400; 22; 1.84; 3; 20; 10; 1; 12; 200; 27; 25; 2 [IU]/1; [IU]/1; MG/1; [IU]/1; MG/1; MG/1; MG/1; MG/1; MG/1; MG/1; UG/1; MG/1; MG/1; MG/1; MG/1
1 TABLET ORAL DAILY
Status: DISCONTINUED | OUTPATIENT
Start: 2023-08-03 | End: 2023-08-04 | Stop reason: HOSPADM

## 2023-08-02 RX ORDER — SIMETHICONE 80 MG
1 TABLET,CHEWABLE ORAL EVERY 6 HOURS PRN
Status: DISCONTINUED | OUTPATIENT
Start: 2023-08-02 | End: 2023-08-04 | Stop reason: HOSPADM

## 2023-08-02 RX ORDER — DIPHENOXYLATE HYDROCHLORIDE AND ATROPINE SULFATE 2.5; .025 MG/1; MG/1
2 TABLET ORAL EVERY 6 HOURS PRN
Status: DISCONTINUED | OUTPATIENT
Start: 2023-08-02 | End: 2023-08-02

## 2023-08-02 RX ORDER — SIMETHICONE 80 MG
1 TABLET,CHEWABLE ORAL 4 TIMES DAILY PRN
Status: DISCONTINUED | OUTPATIENT
Start: 2023-08-02 | End: 2023-08-02

## 2023-08-02 RX ORDER — OXYTOCIN 10 [USP'U]/ML
10 INJECTION, SOLUTION INTRAMUSCULAR; INTRAVENOUS ONCE AS NEEDED
Status: DISCONTINUED | OUTPATIENT
Start: 2023-08-02 | End: 2023-08-02

## 2023-08-02 RX ORDER — TRANEXAMIC ACID 10 MG/ML
1000 INJECTION, SOLUTION INTRAVENOUS ONCE AS NEEDED
Status: COMPLETED | OUTPATIENT
Start: 2023-08-02 | End: 2023-08-02

## 2023-08-02 RX ORDER — DIPHENOXYLATE HYDROCHLORIDE AND ATROPINE SULFATE 2.5; .025 MG/1; MG/1
2 TABLET ORAL EVERY 6 HOURS PRN
Status: DISCONTINUED | OUTPATIENT
Start: 2023-08-02 | End: 2023-08-04 | Stop reason: HOSPADM

## 2023-08-02 RX ORDER — DIPHENOXYLATE HYDROCHLORIDE AND ATROPINE SULFATE 2.5; .025 MG/1; MG/1
1 TABLET ORAL 4 TIMES DAILY PRN
Status: DISCONTINUED | OUTPATIENT
Start: 2023-08-02 | End: 2023-08-02

## 2023-08-02 RX ORDER — MISOPROSTOL 200 UG/1
800 TABLET ORAL ONCE AS NEEDED
Status: DISCONTINUED | OUTPATIENT
Start: 2023-08-02 | End: 2023-08-02

## 2023-08-02 RX ORDER — ENOXAPARIN SODIUM 100 MG/ML
40 INJECTION SUBCUTANEOUS EVERY 12 HOURS
Status: DISCONTINUED | OUTPATIENT
Start: 2023-08-03 | End: 2023-08-02

## 2023-08-02 RX ORDER — OXYTOCIN 10 [USP'U]/ML
10 INJECTION, SOLUTION INTRAMUSCULAR; INTRAVENOUS ONCE AS NEEDED
Status: COMPLETED | OUTPATIENT
Start: 2023-08-02 | End: 2023-08-02

## 2023-08-02 RX ORDER — ENOXAPARIN SODIUM 100 MG/ML
60 INJECTION SUBCUTANEOUS EVERY 12 HOURS
Status: DISCONTINUED | OUTPATIENT
Start: 2023-08-03 | End: 2023-08-04 | Stop reason: HOSPADM

## 2023-08-02 RX ORDER — OXYTOCIN/RINGER'S LACTATE 30/500 ML
334 PLASTIC BAG, INJECTION (ML) INTRAVENOUS ONCE AS NEEDED
Status: DISCONTINUED | OUTPATIENT
Start: 2023-08-02 | End: 2023-08-02

## 2023-08-02 RX ORDER — OXYTOCIN/RINGER'S LACTATE 30/500 ML
95 PLASTIC BAG, INJECTION (ML) INTRAVENOUS ONCE AS NEEDED
Status: DISCONTINUED | OUTPATIENT
Start: 2023-08-02 | End: 2023-08-04 | Stop reason: HOSPADM

## 2023-08-02 RX ORDER — OXYTOCIN/RINGER'S LACTATE 30/500 ML
95 PLASTIC BAG, INJECTION (ML) INTRAVENOUS ONCE AS NEEDED
Status: DISCONTINUED | OUTPATIENT
Start: 2023-08-02 | End: 2023-08-02

## 2023-08-02 RX ORDER — ONDANSETRON 8 MG/1
8 TABLET, ORALLY DISINTEGRATING ORAL EVERY 8 HOURS PRN
Status: DISCONTINUED | OUTPATIENT
Start: 2023-08-02 | End: 2023-08-02

## 2023-08-02 RX ORDER — MISOPROSTOL 200 UG/1
800 TABLET ORAL
Status: DISCONTINUED | OUTPATIENT
Start: 2023-08-02 | End: 2023-08-02

## 2023-08-02 RX ORDER — IBUPROFEN 600 MG/1
600 TABLET ORAL EVERY 6 HOURS
Status: DISCONTINUED | OUTPATIENT
Start: 2023-08-02 | End: 2023-08-04 | Stop reason: HOSPADM

## 2023-08-02 RX ORDER — PROCHLORPERAZINE EDISYLATE 5 MG/ML
5 INJECTION INTRAMUSCULAR; INTRAVENOUS EVERY 6 HOURS PRN
Status: DISCONTINUED | OUTPATIENT
Start: 2023-08-02 | End: 2023-08-02

## 2023-08-02 RX ORDER — ROPIVACAINE HYDROCHLORIDE 2 MG/ML
INJECTION, SOLUTION EPIDURAL; INFILTRATION
Status: COMPLETED | OUTPATIENT
Start: 2023-08-02 | End: 2023-08-02

## 2023-08-02 RX ADMIN — OXYTOCIN 334 MILLI-UNITS/MIN: 10 INJECTION, SOLUTION INTRAMUSCULAR; INTRAVENOUS at 07:08

## 2023-08-02 RX ADMIN — ROPIVACAINE HYDROCHLORIDE 8 ML: 2 INJECTION, SOLUTION EPIDURAL; INFILTRATION at 07:08

## 2023-08-02 RX ADMIN — ROPIVACAINE HYDROCHLORIDE 12 ML/HR: 2 INJECTION, SOLUTION EPIDURAL; INFILTRATION at 08:08

## 2023-08-02 RX ADMIN — SODIUM CHLORIDE, POTASSIUM CHLORIDE, SODIUM LACTATE AND CALCIUM CHLORIDE: 600; 310; 30; 20 INJECTION, SOLUTION INTRAVENOUS at 08:08

## 2023-08-02 RX ADMIN — SODIUM CHLORIDE, POTASSIUM CHLORIDE, SODIUM LACTATE AND CALCIUM CHLORIDE 1000 ML: 600; 310; 30; 20 INJECTION, SOLUTION INTRAVENOUS at 07:08

## 2023-08-02 RX ADMIN — IBUPROFEN 600 MG: 600 TABLET, FILM COATED ORAL at 05:08

## 2023-08-02 RX ADMIN — OXYTOCIN 2 MILLI-UNITS/MIN: 10 INJECTION, SOLUTION INTRAMUSCULAR; INTRAVENOUS at 09:08

## 2023-08-02 RX ADMIN — TRANEXAMIC ACID 1000 MG: 10 INJECTION, SOLUTION INTRAVENOUS at 07:08

## 2023-08-02 RX ADMIN — OXYTOCIN 10 UNITS: 10 INJECTION, SOLUTION INTRAMUSCULAR; INTRAVENOUS at 07:08

## 2023-08-02 RX ADMIN — METHYLERGONOVINE MALEATE 200 MCG: 0.2 INJECTION, SOLUTION INTRAMUSCULAR; INTRAVENOUS at 07:08

## 2023-08-02 NOTE — HOSPITAL COURSE
Admit for labor management  8/3/23 PPD #1, stable after PP hemorrhage, doing well, d/c home tomorrow  8/4/2023. Doing well, bleeding stable. Denies complaints. Reviewed PP warning signs/precautions. D/c to home.

## 2023-08-02 NOTE — ANESTHESIA PROCEDURE NOTES
Epidural    Patient location during procedure: OB   Reason for block: primary anesthetic   Reason for block: labor analgesia requested by patient and obstetrician  Diagnosis: IUP, Labor Pain   Start time: 8/2/2023 7:48 AM  Timeout: 8/2/2023 7:48 AM  End time: 8/2/2023 8:00 AM    Staffing  Performing Provider: Dat Morales CRNA  Authorizing Provider: Kervin Encarnacion II, MD    Staffing  Performed by: Dat Morales CRNA  Authorized by: Kervin Encarnacion II, MD        Preanesthetic Checklist  Completed: patient identified, IV checked, site marked, risks and benefits discussed, monitors and equipment checked, pre-op evaluation, timeout performed, anesthesia consent given, hand hygiene performed and patient being monitored  Preparation  Patient position: sitting  Prep: ChloraPrep  Patient monitoring: Pulse Ox and Blood Pressure  Reason for block: primary anesthetic   Epidural  Skin Anesthetic: lidocaine 1%  Skin Wheal: 3 mL  Administration type: continuous  Approach: midline  Interspace: L4-5    Injection technique: ANNMARIE air  Needle and Epidural Catheter  Needle type: Tuohy   Needle gauge: 17  Needle length: 3.5 inches  Needle insertion depth: 8 cm  Catheter type: springwound and multi-orifice  Catheter size: 19 G  Catheter at skin depth: 14 cm  Insertion Attempts: 1  Test dose: 3 mL of lidocaine 1.5% with Epi 1-to-200,000  Additional Documentation: incremental injection, no paresthesia on injection, negative aspiration for heme and CSF, no significant pain on injection, no signs/symptoms of IV or SA injection and no significant complaints from patient  Needle localization: anatomical landmarks  Assessment  Upper dermatomal levels - Left: T10  Right: T10   Dermatomal levels determined by alcohol wipe  Ease of block: easy  Patient's tolerance of the procedure: comfortable throughout block and no complaints No inadvertent dural puncture with Tuohy.  Dural puncture not performed with spinal  needle    Medications:    Medications: ropivacaine (NAROPIN) solution 0.2% - Epidural   8 mL - 8/2/2023 7:52:00 AM

## 2023-08-02 NOTE — H&P
[facility] - Labor & Delivery  Obstetrics  History & Physical    Patient Name: Naseem Moura  MRN: [id number]  Admission Date: 2023  Primary Care Provider: Primary Doctor No    Subjective:     Principal Problem:Normal labor    History of Present Illness:  Presents with C/O contractions      Obstetric HPI:  Patient reports  contractions, active fetal movement, No vaginal bleeding , No loss of fluid     This pregnancy has been complicated by Hx HPV, obesity, failed 1 hr with normal 3 hr GTT    OB History    Para Term  AB Living   3 1 1 0 1 1   SAB IAB Ectopic Multiple Live Births   1 0 0 0 1      # Outcome Date GA Lbr Atr/2nd Weight Sex Delivery Anes PTL Lv   3 Current            2 Term 20 38w2d / 01:06 3.34 kg (7 lb 5.8 oz) M Vag-Spont EPI N SANJIV      Name: NICOLÁS MOURA      Apgar1: 7  Apgar5: 9   1 SAB 2018 6w0d            History reviewed. No pertinent past medical history.  History reviewed. No pertinent surgical history.    PTA Medications   Medication Sig    aspirin 81 MG Chew Take 1 tablet (81 mg total) by mouth once daily.    -IRON-FOLATE 1-DSS-DHA ORAL Take 1 capsule by mouth Daily.       Review of patient's allergies indicates:  No Known Allergies     Family History       Problem Relation (Age of Onset)    Hypertension Mother          Tobacco Use    Smoking status: Never     Passive exposure: Never    Smokeless tobacco: Never   Substance and Sexual Activity    Alcohol use: Never    Drug use: Never    Sexual activity: Not Currently     Partners: Male     Review of Systems   Gastrointestinal:  Positive for abdominal pain.   Genitourinary:  Positive for pelvic pain.   All other systems reviewed and are negative.     Objective:     Vital Signs (Most Recent):  Temp: 97.9 °F (36.6 °C) (23 0800)  Pulse: 79 (23 08)  Resp: 18 (23)  BP: 126/65 (23)  SpO2: 100 % (23) Vital Signs (24h Range):  Temp:  [97.7 °F (36.5 °C)-97.9 °F  (36.6 °C)] 97.9 °F (36.6 °C)  Pulse:  [73-91] 79  Resp:  [18-20] 18  SpO2:  [98 %-100 %] 100 %  BP: (114-128)/(65-91) 126/65        There is no height or weight on file to calculate BMI.    FHT: Cat 1 (reassuring), ISE applied due to maternal habitus  TOCO:  AROM thin mec, IUPC placed due to maternal habitus      Physical Exam:   Constitutional: She is oriented to person, place, and time. She appears well-developed and well-nourished.        Pulmonary/Chest: Effort normal.        Abdominal: Soft.     Genitourinary:    Vagina and uterus normal.             Musculoskeletal: Normal range of motion and moves all extremeties.       Neurological: She is alert and oriented to person, place, and time.    Skin: Skin is warm and dry.    Psychiatric: She has a normal mood and affect. Her behavior is normal. Judgment and thought content normal.        Cervix:  4.5/70/V/-2     Significant Labs:  Lab Results   Component Value Date    GROUPTRH O POS 2023    HEPBSAG Non-reactive 2022    STREPBCULT No Group B Streptococcus isolated 2023       I have personallly reviewed all pertinent lab results from the last 24 hours.    Assessment/Plan:     32 y.o. female  at 39w4d for:    * Normal labor  Admit for labor management  Epidural when desires  Anticipate     Abnormal [place] glucose challenge test, antepartum  Passed 3 hr GTT  EFW 7# 14 oz at 36 weeks    Obesity in pregnancy  Lovenox PP        Amanda Francis CNM  Obstetrics  [facility] - [facility]

## 2023-08-02 NOTE — ANESTHESIA POSTPROCEDURE EVALUATION
Anesthesia Post Evaluation    Patient: Naseem Moura    Procedure(s) Performed: * No procedures listed *    Final Anesthesia Type: epidural      Patient location during evaluation: labor & delivery  Patient participation: Yes- Able to Participate  Level of consciousness: awake and alert and oriented  Post-procedure vital signs: reviewed and stable  Pain management: adequate  Airway patency: patent  JOCELYN mitigation strategies: Use of major conduction anesthesia (spinal/epidural) or peripheral nerve block  PONV status at discharge: No PONV  Anesthetic complications: no      Cardiovascular status: hemodynamically stable  Respiratory status: spontaneous ventilation and room air  Hydration status: euvolemic  Follow-up not needed.          Vitals Value Taken Time   /65 08/02/23 1703   Temp 36.7 °C (98 °F) 08/02/23 1500   Pulse 75 08/02/23 1703   Resp 16 08/02/23 1700   SpO2 100 % 08/02/23 1447   Vitals shown include unvalidated device data.      No case tracking events are documented in the log.      Pain/Aryan Score: Pain Rating Prior to Med Admin: 5 (8/2/2023  5:04 PM)

## 2023-08-02 NOTE — PLAN OF CARE
[facility] - Labor & Delivery  Discharge Assessment    Primary Care Provider: Primary Doctor No     OB Screen (most recent)       OB Screen - 23 1458          OB SCREEN    Assessment Type Discharge Planning Assessment     Source of Information patient;health record     Received Prenatal Care Yes     Any indications/suspicions for None     Is this a teen pregnancy No     Is the baby in NICU No     Indication for adoption/[facility] No     Indication for DME/post-acute needs No     HIV (+) No     Any congenital  disorders No     Fetal demise/ death No

## 2023-08-02 NOTE — PROGRESS NOTES
Pharmacist Renal Dose Adjustment Note    Naseem Moura is a 32 y.o. female being treated with the medication enoxaparin.    Patient Data:    Vital Signs (Most Recent):  Temp: 98 °F (36.7 °C) (08/02/23 1500)  Pulse: 90 (08/02/23 1520)  Resp: 20 (08/02/23 1500)  BP: 119/70 (08/02/23 1520)  SpO2: 100 % (08/02/23 1430) Vital Signs (72h Range):  Temp:  [97.7 °F (36.5 °C)-98 °F (36.7 °C)]   Pulse:  []   Resp:  [16-20]   BP: (109-150)/(50-91)   SpO2:  [98 %-100 %]      Recent Labs   Lab 08/02/23  0715   CREATININE 0.7     Serum creatinine: 0.7 mg/dL 08/02/23 0715  Estimated creatinine clearance: 150.9 mL/min    Enoxaparin 40 mg every 12 hours was adjusted to 60 mg every 12 hours according to [facility]'s renal dose adjustment policy for BMI>50.    Pharmacist's Name: Evelyn Anand PharmD 8/2/2023 5:01 PM  Pharmacist's Extension: [phone]

## 2023-08-02 NOTE — LACTATION NOTE
Lactation Rounds:    Mother speaks english well,  not needed.     A breast pump was brought to mothers room. She stated she did not want to pump yet but would try breastfeeding. She is too tired at this time to attempt breast and infant was just fed via bottle of formula. Encouraged mother to offer breast before bottle.     Lactation packet reviewed for days 1-2.  Discussed early feeding cues and encouraged mother to feed baby in response to those cues. Encouraged on demand feedings and skin to skin.  Reviewed normal feeding expectations of 8 or more feedings per 24 hour period, cues that babies use to signal hunger and satiety and cluster feeding. Discussed the adequacy of colostrum and baby belly size for the first 3 days of life along with expected output.     Discussed risks of introducing a pacifier or artificial nipple and discussed the AAP recommendation to avoid the use of pacifiers until 1 month of age for breastfeeding infants.     Mother states understanding and verbalized appropriate recall. Encouraged mother to call for assistance when desired or when infant is showing signs of hunger, contact number provided, mother verbalizes understanding.

## 2023-08-02 NOTE — ANESTHESIA PREPROCEDURE EVALUATION
08/02/2023  Naseem Moura is a 32 y.o., female.      Pre-op Assessment    I have reviewed the Patient Summary Reports.     I have reviewed the Nursing Notes.    I have reviewed the Medications.     Review of Systems  Anesthesia Hx:  No problems with previous Anesthesia  History of prior surgery of interest to airway management or planning: Previous anesthesia: Epidural Denies Family Hx of Anesthesia complications.   Denies Personal Hx of Anesthesia complications.   Social:  Non-Smoker, No Alcohol Use    Hematology/Oncology:  Hematology Normal   Oncology Normal     EENT/Dental:EENT/Dental Normal   Cardiovascular:  Cardiovascular Normal Exercise tolerance: good  Denies Pacemaker.  Denies Hypertension.  NYHA Classification I ECG has been reviewed.    Pulmonary:  Pulmonary Normal    Renal/:  Renal/ Normal     Hepatic/GI:  Hepatic/GI Normal    Musculoskeletal:  Musculoskeletal Normal    Neurological:  Neurology Normal    Endocrine:  Endocrine Normal  Morbid Obesity / BMI > 40  Psych:  Psychiatric Normal           Physical Exam  General: Well nourished, Cooperative, Alert and Oriented    Airway:  Mallampati: II   Mouth Opening: Normal  TM Distance: Normal  Tongue: Normal  Neck ROM: Normal ROM    Dental:  Intact        Anesthesia Plan  Type of Anesthesia, risks & benefits discussed:    Anesthesia Type: Epidural  Intra-op Monitoring Plan: Standard ASA Monitors  Post Op Pain Control Plan: epidural analgesia  Informed Consent: Informed consent signed with the Patient and all parties understand the risks and agree with anesthesia plan.  All questions answered.   ASA Score: 3  Day of Surgery Review of History & Physical: I have interviewed and examined the patient. I have reviewed the patient's H&P dated:     Ready For Surgery From Anesthesia Perspective.     .

## 2023-08-02 NOTE — PROGRESS NOTES
S: comfortable with epidural  O:  VS reviewed, afebrile   Vitals:    08/02/23 1120 08/02/23 1130 08/02/23 1140 08/02/23 1150   BP: (!) 142/69 (!) 112/52  (!) 109/51   Pulse: 77 70 72 73   Resp:       Temp:       TempSrc:       SpO2: 100% 100% 100% 100%       FHTs:  CAT 2, moderate variability, variables present  UC:  Q 2-3  SVE: 7/90 per RN    A: IUP @ 39w4d ;     Patient Active Problem List   Diagnosis    Cervical high risk HPV (human papillomavirus) test positive    Obesity in pregnancy    Normal labor    Abnormal [place] glucose challenge test, antepartum       P: amnioinfusion if variables  persist  Continue close monitoring of maternal/fetal status

## 2023-08-02 NOTE — PLAN OF CARE
Discussed feeding choice with mother.  Reviewed benefits of breastfeeding and risks of formula feeding. Mother states her intention is to pump

## 2023-08-02 NOTE — SUBJECTIVE & OBJECTIVE
Obstetric HPI:  Patient reports  contractions, active fetal movement, No vaginal bleeding , No loss of fluid     This pregnancy has been complicated by Hx HPV, obesity, failed 1 hr with normal 3 hr GTT    OB History    Para Term  AB Living   3 1 1 0 1 1   SAB IAB Ectopic Multiple Live Births   1 0 0 0 1      # Outcome Date GA Lbr Art/2nd Weight Sex Delivery Anes PTL Lv   3 Current            2 Term 20 38w2d / 01:06 3.34 kg (7 lb 5.8 oz) M Vag-Spont EPI N SANJIV      Name: NICOLÁS HAYNES      Apgar1: 7  Apgar5: 9   1 SAB 2018 6w0d            History reviewed. No pertinent past medical history.  History reviewed. No pertinent surgical history.    PTA Medications   Medication Sig    aspirin 81 MG Chew Take 1 tablet (81 mg total) by mouth once daily.    -IRON-FOLATE 1-DSS-DHA ORAL Take 1 capsule by mouth Daily.       Review of patient's allergies indicates:  No Known Allergies     Family History       Problem Relation (Age of Onset)    Hypertension Mother          Tobacco Use    Smoking status: Never     Passive exposure: Never    Smokeless tobacco: Never   Substance and Sexual Activity    Alcohol use: Never    Drug use: Never    Sexual activity: Not Currently     Partners: Male     Review of Systems   Gastrointestinal:  Positive for abdominal pain.   Genitourinary:  Positive for pelvic pain.   All other systems reviewed and are negative.     Objective:     Vital Signs (Most Recent):  Temp: 97.9 °F (36.6 °C) (23 0800)  Pulse: 79 (23 0800)  Resp: 18 (23 0800)  BP: 126/65 (23 0800)  SpO2: 100 % (23 0800) Vital Signs (24h Range):  Temp:  [97.7 °F (36.5 °C)-97.9 °F (36.6 °C)] 97.9 °F (36.6 °C)  Pulse:  [73-91] 79  Resp:  [18-20] 18  SpO2:  [98 %-100 %] 100 %  BP: (114-128)/(65-91) 126/65        There is no height or weight on file to calculate BMI.    FHT: Cat 1 (reassuring), ISE applied due to maternal habitus  TOCO:  AROM thin mec, IUPC placed due to maternal  habitus      Physical Exam:   Constitutional: She is oriented to person, place, and time. She appears well-developed and well-nourished.        Pulmonary/Chest: Effort normal.        Abdominal: Soft.     Genitourinary:    Vagina and uterus normal.             Musculoskeletal: Normal range of motion and moves all extremeties.       Neurological: She is alert and oriented to person, place, and time.    Skin: Skin is warm and dry.    Psychiatric: She has a normal mood and affect. Her behavior is normal. Judgment and thought content normal.        Cervix:  4.5/70/V/-2     Significant Labs:  Lab Results   Component Value Date    GROUPTRH O POS 08/02/2023    HEPBSAG Non-reactive 12/13/2022    STREPBCULT No Group B Streptococcus isolated 07/14/2023       I have personallly reviewed all pertinent lab results from the last 24 hours.

## 2023-08-02 NOTE — L&D DELIVERY NOTE
[facility] - Labor & Delivery  Vaginal Delivery   Operative Note    SUMMARY     Normal spontaneous vaginal delivery of live infant, skin to skin was unable to be performed due to needing deep suctioning due to meconium  .  Infant delivered position OA over perineum.  Nuchal cord: No.    Spontaneous delivery of placenta and IV pitocin given noting good uterine tone.  1st degree laceration noted and repaired with 2.0 vicryl x's 1  Patient tolerated delivery well. Sponge needle and lap counted correctly x2.    Indications: Normal vaginal delivery  Pregnancy complicated by:   Patient Active Problem List   Diagnosis    Cervical high risk HPV (human papillomavirus) test positive    Obesity in pregnancy    Normal vaginal delivery    Obstetrical laceration    Abnormal [place] glucose challenge test, antepartum    Normal  (single liveborn)     Admitting GA: 39w4d    Delivery Information for Donaldo Moura    Birth information:  YOB: 2025   Time of birth: 2:42 PM   Sex: male   Head Delivery Date/Time:     Delivery type:    Gestational Age: 39w4d        Delivery Providers    Delivering clinician:            Measurements    Weight:   Length:          Apgars    Living status:   Apgar Component Scores:  1 min.:  5 min.:  10 min.:  15 min.:  20 min.:    Skin color:         Heart rate:         Reflex irritability:         Muscle tone:         Respiratory effort:         Total:                                  Interventions/Resuscitation           Cord    No data filed       Placenta    Placenta delivery date/time:   Placenta removal:            Labor Events:       labor:       Labor Onset Date/Time:         Dilation Complete Date/Time:         Start Pushing Date/Time:         Start Pushing Date/Time:       Rupture Date/Time: 23  0855         Rupture type: ARM (Artificial Rupture)         Fluid Amount:       Fluid Color: Meconium Thin               steroids:       Antibiotics given for  GBS:       Induction:       Indications for induction:        Augmentation:       Indications for augmentation:       Labor complications:       Additional complications:          Cervical ripening:                     Delivery:      Episiotomy:       Indication for Episiotomy:       Perineal Lacerations:   Repaired:      Periurethral Laceration:   Repaired:     Labial Laceration:   Repaired:     Sulcus Laceration:   Repaired:     Vaginal Laceration:   Repaired:     Cervical Laceration:   Repaired:     Repair suture:       Repair # of packets:       Last Value - EBL - Nursing (mL):       Sum - EBL - Nursing (mL): 0     Last Value - EBL - Anesthesia (mL):      Calculated QBL (mL):       Vaginal Sweep Performed:       Surgicount Correct:       Vaginal Packing:   Quantity:       Other providers:            Details (if applicable):  Trial of Labor      Categorization:      Priority:     Indications for :     Incision Type:       Additional  information:  Forceps:    Vacuum:    Breech:    Observed anomalies    Other (Comments):

## 2023-08-03 PROBLEM — O99.810 ABNORMAL O'SULLIVAN GLUCOSE CHALLENGE TEST, ANTEPARTUM: Status: RESOLVED | Noted: 2023-05-21 | Resolved: 2023-08-03

## 2023-08-03 PROCEDURE — 63600175 PHARM REV CODE 636 W HCPCS: Performed by: ADVANCED PRACTICE MIDWIFE

## 2023-08-03 PROCEDURE — 99231 PR SUBSEQUENT HOSPITAL CARE,LEVL I: ICD-10-PCS | Mod: ,,, | Performed by: MIDWIFE

## 2023-08-03 PROCEDURE — 11000001 HC ACUTE MED/SURG PRIVATE ROOM

## 2023-08-03 PROCEDURE — 25000003 PHARM REV CODE 250: Performed by: ADVANCED PRACTICE MIDWIFE

## 2023-08-03 PROCEDURE — 99231 SBSQ HOSP IP/OBS SF/LOW 25: CPT | Mod: ,,, | Performed by: MIDWIFE

## 2023-08-03 RX ADMIN — HYDROCODONE BITARTRATE AND ACETAMINOPHEN 1 TABLET: 5; 325 TABLET ORAL at 09:08

## 2023-08-03 RX ADMIN — IBUPROFEN 600 MG: 600 TABLET, FILM COATED ORAL at 11:08

## 2023-08-03 RX ADMIN — ENOXAPARIN SODIUM 60 MG: 60 INJECTION SUBCUTANEOUS at 10:08

## 2023-08-03 RX ADMIN — IBUPROFEN 600 MG: 600 TABLET, FILM COATED ORAL at 05:08

## 2023-08-03 RX ADMIN — PRENATAL VITAMINS-IRON FUMARATE 27 MG IRON-FOLIC ACID 0.8 MG TABLET 1 TABLET: at 09:08

## 2023-08-03 NOTE — LACTATION NOTE
Lactation Rounds:   Infant is 16 hours old. No weight loss documented at this time. Infant voiding (x2) and stooling (x2) wnl.     Mother is walking around inside her room. Mother reports that infant took only formula over night. Mother states that she was unwell last night and did not attempt to breastfeed infant. Offered to assist with latching at this time, mother states that she is not ready to breast feed infant.     Discussed mechanism to promote and maintain full milk supply. Because baby is being supplemented away from the breast, mother was:   - informed that breastfeeding support and assistance is available as needed  - encouraged to express milk from both breasts each time a supplement is given  - encouraged to use her own collected milk as a first choice for supplementation  Mother was encouraged to request assistance as needed and voices understanding.     Mother does not have a breast pump for home use. [facility] [employer] Breast Pump Request Form provided and completed, and faxed to [facility].     Mother denies any further lactation needs or concerns at this time. Encouraged mother to call for assistance when desired or when infant is showing signs of hunger. Mother verbalizes understanding of all education and counseling.

## 2023-08-03 NOTE — PROGRESS NOTES
Notified per RN of increased bleeding. Large amount of bright  red bleeding and clots noted upon arrival at bedside.  Montelongo catheter inserted.  IM and IV pitocin, TXA,  as well as methergine given.  Patient placed in stirrups and ring forceps with raytec 4x4's used for uterine sweep.  Quantitative  cc.  Uterus was noted to be firm and bleeding scant post uterine sweep.  Order for CBC placed.  Epidural was dosed and will monitor closely.  Raytec count correct

## 2023-08-03 NOTE — NURSING
JAH Patel called to bedside per LEXUS Thompson due to heavy bleeding. Alfred RN, PaulinaRN, and LEXUS Miranda at bedside as well. Hemorrhage medications given per JAH Patel orders. Will continue to monitor.

## 2023-08-03 NOTE — PLAN OF CARE
Patient currently resting with  at bedside. Postpartal hemorrhage has resolved with scant bleeding on green pads. IV fluids are maintained. Fundus is firm without massage 1 cm above umbilicus. No problems identified at this time. Will continue to monitor.

## 2023-08-04 VITALS
TEMPERATURE: 99 F | DIASTOLIC BLOOD PRESSURE: 54 MMHG | WEIGHT: 292 LBS | OXYGEN SATURATION: 98 % | BODY MASS INDEX: 51.73 KG/M2 | HEART RATE: 87 BPM | SYSTOLIC BLOOD PRESSURE: 108 MMHG | RESPIRATION RATE: 16 BRPM

## 2023-08-04 PROCEDURE — 99238 HOSP IP/OBS DSCHRG MGMT 30/<: CPT | Mod: ,,, | Performed by: MIDWIFE

## 2023-08-04 PROCEDURE — 63600175 PHARM REV CODE 636 W HCPCS: Performed by: ADVANCED PRACTICE MIDWIFE

## 2023-08-04 PROCEDURE — 25000003 PHARM REV CODE 250: Performed by: ADVANCED PRACTICE MIDWIFE

## 2023-08-04 PROCEDURE — 99238 PR HOSPITAL DISCHARGE DAY,<30 MIN: ICD-10-PCS | Mod: ,,, | Performed by: MIDWIFE

## 2023-08-04 RX ORDER — IBUPROFEN 600 MG/1
600 TABLET ORAL EVERY 8 HOURS PRN
Qty: 30 TABLET | Refills: 0 | Status: SHIPPED | OUTPATIENT
Start: 2023-08-04

## 2023-08-04 RX ADMIN — ENOXAPARIN SODIUM 60 MG: 60 INJECTION SUBCUTANEOUS at 08:08

## 2023-08-04 RX ADMIN — PRENATAL VITAMINS-IRON FUMARATE 27 MG IRON-FOLIC ACID 0.8 MG TABLET 1 TABLET: at 08:08

## 2023-08-04 NOTE — SUBJECTIVE & OBJECTIVE
Interval History: PPD #1    She is doing well this morning. She is tolerating a regular diet without nausea or vomiting. She is voiding spontaneously. She is ambulating. She has passed flatus, and has not a BM. Vaginal bleeding is mild. She denies fever or chills. Abdominal pain is mild and controlled with oral medications. She Is breastfeeding. She desires circumcision for her male baby: no.    Objective:     Vital Signs (Most Recent):  Temp: 97.7 °F (36.5 °C) (08/03/23 2112)  Pulse: 79 (08/03/23 2112)  Resp: 18 (08/03/23 2112)  BP: (!) 108/59 (08/03/23 2112)  SpO2: 98 % (08/03/23 2112) Vital Signs (24h Range):  Temp:  [97.7 °F (36.5 °C)-98.2 °F (36.8 °C)] 97.7 °F (36.5 °C)  Pulse:  [71-89] 79  Resp:  [18] 18  SpO2:  [98 %] 98 %  BP: ()/(54-79) 108/59     Weight: 132.5 kg (292 lb)  Body mass index is 51.73 kg/m².      Intake/Output Summary (Last 24 hours) at 8/3/2023 2355  Last data filed at 8/3/2023 0600  Gross per 24 hour   Intake --   Output 950 ml   Net -950 ml         Significant Labs:  Lab Results   Component Value Date    GROUPTRH O POS 08/02/2023    HEPBSAG Non-reactive 12/13/2022    STREPBCULT No Group B Streptococcus isolated 07/14/2023     Recent Labs   Lab 08/02/23 2024   HGB 12.3   HCT 37.1       I have personallly reviewed all pertinent lab results from the last 24 hours.  Recent Lab Results       None            Physical Exam:   Constitutional: She is oriented to person, place, and time. She appears well-developed and well-nourished.    HENT:   Head: Normocephalic.      Cardiovascular:  Normal rate.             Pulmonary/Chest: Effort normal.        Abdominal: Soft.     Genitourinary: There is vaginal discharge in the vagina.    Genitourinary Comments:  lochia small/mod per pt             Musculoskeletal: Normal range of motion and moves all extremeties.       Neurological: She is alert and oriented to person, place, and time.    Skin: Skin is warm and dry. Capillary refill takes less than 2  seconds.    Psychiatric: She has a normal mood and affect. Her behavior is normal. Judgment and thought content normal.       Review of Systems   Genitourinary:  Positive for vaginal bleeding.   All other systems reviewed and are negative.

## 2023-08-04 NOTE — SUBJECTIVE & OBJECTIVE
Interval History: PPD2    She is doing well this morning. She is tolerating a regular diet without nausea or vomiting. She is voiding spontaneously. She is ambulating. She has passed flatus, and has a BM. Vaginal bleeding is moderate. She denies fever or chills. Abdominal pain is mild and controlled with oral medications. She Is breastfeeding. She desires circumcision for her male baby: no.    Objective:     Vital Signs (Most Recent):  Temp: 98.7 °F (37.1 °C) (08/04/23 0826)  Pulse: 87 (08/04/23 0826)  Resp: 16 (08/04/23 0826)  BP: (!) 108/54 (08/04/23 0826)  SpO2: 98 % (08/04/23 0826) Vital Signs (24h Range):  Temp:  [97.7 °F (36.5 °C)-98.7 °F (37.1 °C)] 98.7 °F (37.1 °C)  Pulse:  [79-90] 87  Resp:  [16-18] 16  SpO2:  [97 %-98 %] 98 %  BP: (104-109)/(54-59) 108/54     Weight: 132.5 kg (292 lb)  Body mass index is 51.73 kg/m².    No intake or output data in the 24 hours ending 08/04/23 1117      Significant Labs:  Lab Results   Component Value Date    GROUPTRH O POS 08/02/2023    HEPBSAG Non-reactive 12/13/2022    STREPBCULT No Group B Streptococcus isolated 07/14/2023     Recent Labs   Lab 08/02/23 2024   HGB 12.3   HCT 37.1       CBC:   Recent Labs   Lab 08/02/23 2024   WBC 20.50*   RBC 4.20   HGB 12.3   HCT 37.1      MCV 88   MCH 29.3   MCHC 33.2     I have personallly reviewed all pertinent lab results from the last 24 hours.    Physical Exam:   Constitutional: She is oriented to person, place, and time. She appears well-developed and well-nourished.    HENT:   Head: Normocephalic and atraumatic.    Eyes: Conjunctivae and EOM are normal.     Cardiovascular:  Normal rate, regular rhythm and normal heart sounds.             Pulmonary/Chest: Effort normal and breath sounds normal. No respiratory distress.        Abdominal: Soft. Bowel sounds are normal. She exhibits no distension. There is no abdominal tenderness.     Genitourinary:    Vagina and uterus normal.             Musculoskeletal: Normal range of  motion and moves all extremeties.       Neurological: She is alert and oriented to person, place, and time.    Skin: Skin is warm and dry.    Psychiatric: She has a normal mood and affect. Her behavior is normal. Judgment and thought content normal.       Review of Systems

## 2023-08-04 NOTE — PROGRESS NOTES
[facility] ([facility])  Obstetrics  Postpartum Progress Note    Patient Name: Naseem Moura  MRN: [id number]  Admission Date: 8/2/2023  Hospital Length of Stay: 2 days  Attending Physician: Stormy Rojas,*  Primary Care Provider: Pao, Primary Doctor    Subjective:     Principal Problem:Normal vaginal delivery    Hospital Course:  Admit for labor management  8/3/23 PPD #1, stable after PP hemorrhage, doing well, d/c home tomorrow  8/4/2023. Doing well, bleeding stable. Denies complaints. Reviewed PP warning signs/precautions. D/c to home.       Interval History: PPD2    She is doing well this morning. She is tolerating a regular diet without nausea or vomiting. She is voiding spontaneously. She is ambulating. She has passed flatus, and has a BM. Vaginal bleeding is moderate. She denies fever or chills. Abdominal pain is mild and controlled with oral medications. She Is breastfeeding. She desires circumcision for her male baby: no.    Objective:     Vital Signs (Most Recent):  Temp: 98.7 °F (37.1 °C) (08/04/23 0826)  Pulse: 87 (08/04/23 0826)  Resp: 16 (08/04/23 0826)  BP: (!) 108/54 (08/04/23 0826)  SpO2: 98 % (08/04/23 0826) Vital Signs (24h Range):  Temp:  [97.7 °F (36.5 °C)-98.7 °F (37.1 °C)] 98.7 °F (37.1 °C)  Pulse:  [79-90] 87  Resp:  [16-18] 16  SpO2:  [97 %-98 %] 98 %  BP: (104-109)/(54-59) 108/54     Weight: 132.5 kg (292 lb)  Body mass index is 51.73 kg/m².    No intake or output data in the 24 hours ending 08/04/23 1117      Significant Labs:  Lab Results   Component Value Date    GROUPTRH O POS 08/02/2023    HEPBSAG Non-reactive 12/13/2022    STREPBCULT No Group B Streptococcus isolated 07/14/2023     Recent Labs   Lab 08/02/23 2024   HGB 12.3   HCT 37.1       CBC:   Recent Labs   Lab 08/02/23 2024   WBC 20.50*   RBC 4.20   HGB 12.3   HCT 37.1      MCV 88   MCH 29.3   MCHC 33.2     I have personallly reviewed all pertinent lab results from the last 24 hours.    Physical  Exam:   Constitutional: She is oriented to person, place, and time. She appears well-developed and well-nourished.    HENT:   Head: Normocephalic and atraumatic.    Eyes: Conjunctivae and EOM are normal.     Cardiovascular:  Normal rate, regular rhythm and normal heart sounds.             Pulmonary/Chest: Effort normal and breath sounds normal. No respiratory distress.        Abdominal: Soft. Bowel sounds are normal. She exhibits no distension. There is no abdominal tenderness.     Genitourinary:    Vagina and uterus normal.             Musculoskeletal: Normal range of motion and moves all extremeties.       Neurological: She is alert and oriented to person, place, and time.    Skin: Skin is warm and dry.    Psychiatric: She has a normal mood and affect. Her behavior is normal. Judgment and thought content normal.       Review of Systems    Assessment/Plan:     32 y.o. female  for:    * Normal vaginal delivery  Routine PP care    Delayed postpartum hemorrhage  CBC stable  Patient reports bleeding is light to moderate. Bleeding precautions reviewed.     Normal  (single liveborn)  Care of infant per peds    Obstetrical laceration  Routine Perineal care    Obesity in pregnancy  Lovenox PP        Disposition: As patient meets milestones, will plan to discharge today.    Moon Nj CNM  Obstetrics  [facility] ([facility])

## 2023-08-04 NOTE — PLAN OF CARE
Patient afebrile and had no falls this shift. Fundus firm without massage and below umbilicus. Bleeding light, no clots passed this shift. Voids spontaneously. Ambulates independently. Pain well controlled with oral pain medication. Vital signs stable at this time. Bonding well with infant; responds to infant cues and participates in infant care.       Problem: Diabetes in Pregnancy  Goal: Blood Glucose Level Within Targeted Range  Outcome: Met     Problem: Adult Inpatient Plan of Care  Goal: Plan of Care Review  Outcome: Met  Goal: Patient-Specific Goal (Individualized)  Outcome: Met  Goal: Absence of Hospital-Acquired Illness or Injury  Outcome: Met  Goal: Optimal Comfort and Wellbeing  Outcome: Met  Goal: Readiness for Transition of Care  Outcome: Met     Problem: Bariatric Environmental Safety  Goal: Safety Maintained with Care  Outcome: Met     Problem:  Fall Injury Risk  Goal: Absence of Fall, Infant Drop and Related Injury  Outcome: Met     Problem: Infection  Goal: Absence of Infection Signs and Symptoms  Outcome: Met     Problem: Bleeding (Labor)  Goal: Hemostasis  Outcome: Met     Problem: Change in Fetal Wellbeing (Labor)  Goal: Stable Fetal Wellbeing  Outcome: Met     Problem: Delayed Labor Progression (Labor)  Goal: Effective Progression to Delivery  Outcome: Met     Problem: Infection (Labor)  Goal: Absence of Infection Signs and Symptoms  Outcome: Met     Problem: Labor Pain (Labor)  Goal: Acceptable Pain Control  Outcome: Met     Problem: Uterine Tachysystole (Labor)  Goal: Normal Uterine Contraction Pattern  Outcome: Met

## 2023-08-04 NOTE — LACTATION NOTE
This note was copied from a baby's chart.  Lactation rounds: Infant weight gain 2%. Infant output WNL.    Mother reports that she is latching infant, but primarily formula feeding baby. She states that he is taking about 60 ml of formula every 3 hours. Encouraged mother to offer breast prior to formula feeding. Discussed mechanism of milk production. Mother verbalizes understanding.     Mother anticipates discharge home today. Reviewed signs of good attachment. Reviewed breast massage and compression during feedings and indications for use. Reviewed signs of effective milk transfer and instructed to call pediatrician and lactation if signs not present. Discussed expected feeding and output pattern for days of life 2, 3, 4, & 5+; mother instructed to call pediatrician and lactation if infant is not meeting feeding and output goals.     Reviewed signs of engorgement and expectant management. Reviewed signs of mastitis and instructed mother to call OB provider and lactation if any signs present. Discussed proper use of First Alert Form. Reviewed proper milk handling, collection and storage guidelines. Reviewed nursing diet and nutrition. Discussed resources for medication safety while breastfeeding. Reviewed available outpatient lactation resources.     Mother denies any further lactation needs or concerns at this time. Encouraged mother to call for assistance when desired or when infant is showing signs of hunger. Lactation availability discussed. Mother verbalizes understanding of all education and counseling.

## 2023-08-04 NOTE — DISCHARGE SUMMARY
"[facility] ([facility])  Obstetrics  Discharge Summary      Patient Name: Naseem Moura  MRN: [id number]  Admission Date: 2023  Hospital Length of Stay: 2 days  Discharge Date and Time:  2023 11:20 AM  Attending Physician: Stormy Rojas,*   Discharging Provider: Moon Nj CNM   Primary Care Provider: Pao, Primary Doctor    HPI: Presents with C/O contractions          * No surgery found *     Hospital Course:   Admit for labor management  8/3/23 PPD #1, stable after PP hemorrhage, doing well, d/c home tomorrow  2023. Doing well, bleeding stable. Denies complaints. Reviewed PP warning signs/precautions. D/c to home.            Final Active Diagnoses:    Diagnosis Date Noted POA    PRINCIPAL PROBLEM:  Normal vaginal delivery [O80] 2020 Not Applicable    Normal  (single liveborn) [Z38.2] 2023 No    Delayed postpartum hemorrhage [O72.2] 2023 No    Obstetrical laceration [O71.9] 2020 No    Obesity in pregnancy [O99.210] 2020 Yes      Problems Resolved During this Admission:    Diagnosis Date Noted Date Resolved POA    Abnormal [place] glucose challenge test, antepartum [O99.810] 2023 Yes        Significant Diagnostic Studies: Labs:   CMP No results for input(s): "NA", "K", "CL", "CO2", "GLU", "BUN", "CREATININE", "CALCIUM", "PROT", "ALBUMIN", "BILITOT", "ALKPHOS", "AST", "ALT", "ANIONGAP", "ESTGFRAFRICA", "EGFRNONAA" in the last 48 hours. and CBC   Recent Labs   Lab 23   WBC 20.50*   HGB 12.3   HCT 37.1            Feeding Method: breast    Immunizations     Date Immunization Status Dose Route/Site Given by    23 1612 MMR Incomplete 0.5 mL Subcutaneous/     23 1612 Tdap Incomplete 0.5 mL Intramuscular/           Delivery:    Episiotomy: None   Lacerations: 1st   Repair suture:     Repair # of packets: 1   Blood loss (ml):       Birth information:  YOB: 2025   Time of " birth: 2:42 PM   Sex: male   Delivery type: Vaginal, Spontaneous   Gestational Age: 39w4d    Delivery Clinician:      Other providers:       Additional  information:  Forceps:    Vacuum:    Breech:    Observed anomalies      Living?:           APGARS  One minute Five minutes Ten minutes   Skin color:         Heart rate:         Grimace:         Muscle tone:         Breathing:         Totals: 8  9        Placenta: Delivered:       appearance    Pending Diagnostic Studies:     None          Discharged Condition: good    Disposition: Home or Self Care    Follow Up:   Follow-up Information     Kilo Katz CNM Follow up on 9/1/2023.    Specialty: Obstetrics and Gynecology  Contact information:  [address] [facility] [address] [phone]                       Patient Instructions:   No discharge procedures on file.  Medications:  Current Discharge Medication List      START taking these medications    Details   ibuprofen (ADVIL,MOTRIN) 600 MG tablet Take 1 tablet (600 mg total) by mouth every 8 (eight) hours as needed for Pain.  Qty: 30 tablet, Refills: 0         CONTINUE these medications which have NOT CHANGED    Details   -IRON-FOLATE 1-DSS-DHA ORAL Take 1 capsule by mouth Daily.         STOP taking these medications       aspirin 81 MG Chew Comments:   Reason for Stopping:               Moon Nj CNM  Obstetrics  [facility] ([facility])

## 2023-08-04 NOTE — DISCHARGE INSTRUCTIONS
"Mother Self Care:    Activity: Avoid strenuous exercise and get adequate rest.  No driving until the physician consent given.  Emotional Changes: Most women find birth to be a time of great emotional upheaval.  Sense of loss, mood swings, fatigue, anxiety, and feeling "let down" are common.  If feelings worsen or last more than a week, call your physician.  Breast Care/Breastfeeding: Wear a bra for comfort.  Keep nipples dry and apply your own breast milk or lanolin cream as needed for soreness.  Engorgement can be relieved with warm, moist heat before feedings.  You may also take Ibuprofen.  Breast Care/Bottle Feeding: Wear support bra 24 hours a day for one week.  Avoid stimulation to breasts.  You may use ice packs for discomfort.  Lizet-Care/Vaginal Bleeding: Remember to use your lizet-bottle after urinating.  Your flow will change from red, to pink, to yellow/white color over a period of 2 weeks.  Menstruation will return in 3-8 weeks, or longer if breastfeeding.  Episiotomy Vaginal Delivery: Stitches will dissolve within 10 days to 3 weeks.  Warm baths, tucks, and dermoplast will promote healing.  Avoid bubble baths or strong soaps.  Sexual Activity/Pelvic Rest: No sexual activity, tampons, or douching until your physician gives you consent.  Diet: Continue to eat from the five basic food groups, including plenty of protein, fruits, vegetables, and whole grains.  Limit empty calories and high fat foods.  Drink enough fluids to satisfy thirst and add an extra 500 calories for breastfeeding.  Constipation/Hemorrhoids: Drink plenty of water.  You may take a stool softener or natural laxative (Metamucil). You may use tucks or hemorrhoid ointment and soak in a warm tub.    CALL YOUR OB DOCTOR IF ANY OF THE FOLLOWING OCCURS:  *Heavy bleeding - saturating a pad an hour or passing any large (2-3 inches in size) blood clots.  *Any pain, redness, or tenderness in lower leg.  *You cannot care for yourself or your " baby.  *Any signs of infection-      - Temperature greater than 100.5 degrees F      - Foul smelling vaginal discharge and/or incisional drainage      - Increased episiotomy or incisional pain      - Hot, hard, red or sore area on breast      - Flu-like symptoms      - Any urgency, frequency or burning with urination    Return To the Hospital for further Evaluation:  Headache not relieved by tylenol or ibuprofen  Blurry vision, double vision, seeing spots, or flashing lights  Feeling faint or passing out  Right epigastric pain  Difficulty breathing  Swelling in hands, face, or feet  Any of these symptoms accompanied by nausea/vomiting  Gaining more than 5 pounds in one week  Seizures  These symptoms could be an indication of elevated blood pressure.       If you have any questions that need to be answered immediately please call the Mother[facility]Baby Unit at [phone] and ask to speak to a nurse.

## 2023-08-04 NOTE — PLAN OF CARE
POC reviewed with patient. Fundus firm without massage and below umbilicus. Bleeding light, no clots passed this shift. Voids spontaneously. Ambulates independently. Pain well controlled with oral pain medication. Vital signs stable at this time. Bonding well with infant; responds to infant cues and participates in infant care. Call light placed within patient reach, encouraged use if needed.

## 2023-08-04 NOTE — PROGRESS NOTES
[facility] ([facility])  Obstetrics  Postpartum Progress Note    Patient Name: Naseem Moura  MRN: [id number]  Admission Date: 8/2/2023  Hospital Length of Stay: 1 days  Attending Physician: Stormy Rojas,*  Primary Care Provider: Pao, Primary Doctor    Subjective:     Principal Problem:Normal vaginal delivery    Hospital Course:  Admit for labor management  8/3/23 PPD #1, stable after PP hemorrhage, doing well, d/c home tomorrow      Interval History: PPD #1    She is doing well this morning. She is tolerating a regular diet without nausea or vomiting. She is voiding spontaneously. She is ambulating. She has passed flatus, and has not a BM. Vaginal bleeding is mild. She denies fever or chills. Abdominal pain is mild and controlled with oral medications. She Is breastfeeding. She desires circumcision for her male baby: no.    Objective:     Vital Signs (Most Recent):  Temp: 97.7 °F (36.5 °C) (08/03/23 2112)  Pulse: 79 (08/03/23 2112)  Resp: 18 (08/03/23 2112)  BP: (!) 108/59 (08/03/23 2112)  SpO2: 98 % (08/03/23 2112) Vital Signs (24h Range):  Temp:  [97.7 °F (36.5 °C)-98.2 °F (36.8 °C)] 97.7 °F (36.5 °C)  Pulse:  [71-89] 79  Resp:  [18] 18  SpO2:  [98 %] 98 %  BP: ()/(54-79) 108/59     Weight: 132.5 kg (292 lb)  Body mass index is 51.73 kg/m².      Intake/Output Summary (Last 24 hours) at 8/3/2023 2355  Last data filed at 8/3/2023 0600  Gross per 24 hour   Intake --   Output 950 ml   Net -950 ml         Significant Labs:  Lab Results   Component Value Date    GROUPTRH O POS 08/02/2023    HEPBSAG Non-reactive 12/13/2022    STREPBCULT No Group B Streptococcus isolated 07/14/2023     Recent Labs   Lab 08/02/23 2024   HGB 12.3   HCT 37.1       I have personallly reviewed all pertinent lab results from the last 24 hours.  Recent Lab Results       None            Physical Exam:   Constitutional: She is oriented to person, place, and time. She appears well-developed and well-nourished.     HENT:   Head: Normocephalic.      Cardiovascular:  Normal rate.             Pulmonary/Chest: Effort normal.        Abdominal: Soft.     Genitourinary: There is vaginal discharge in the vagina.    Genitourinary Comments:  lochia small/mod per pt             Musculoskeletal: Normal range of motion and moves all extremeties.       Neurological: She is alert and oriented to person, place, and time.    Skin: Skin is warm and dry. Capillary refill takes less than 2 seconds.    Psychiatric: She has a normal mood and affect. Her behavior is normal. Judgment and thought content normal.       Review of Systems   Genitourinary:  Positive for vaginal bleeding.   All other systems reviewed and are negative.      Assessment/Plan:     32 y.o. female  for:    * Normal vaginal delivery  Routine PP care    Delayed postpartum hemorrhage  CBC stable    Normal  (single liveborn)  Care of infant per peds    Obstetrical laceration  Routine Perineal care    Obesity in pregnancy  Lovenox PP        Disposition: As patient meets milestones, will plan to discharge tomorrow.    Kilo Katz CNM  Obstetrics  [facility] ([facility])

## 2023-09-01 ENCOUNTER — POSTPARTUM VISIT (OUTPATIENT)
Dept: OBSTETRICS AND GYNECOLOGY | Facility: CLINIC | Age: 33
End: 2023-09-01
Payer: MEDICAID

## 2023-09-01 VITALS
DIASTOLIC BLOOD PRESSURE: 78 MMHG | HEIGHT: 63 IN | WEIGHT: 266.31 LBS | BODY MASS INDEX: 47.19 KG/M2 | SYSTOLIC BLOOD PRESSURE: 120 MMHG

## 2023-09-01 PROBLEM — Z87.59 HISTORY OF POSTPARTUM HEMORRHAGE: Status: ACTIVE | Noted: 2023-08-02

## 2023-09-01 PROBLEM — O99.210 OBESITY IN PREGNANCY: Status: RESOLVED | Noted: 2020-03-18 | Resolved: 2023-09-01

## 2023-09-01 PROCEDURE — 59430 PR CARE AFTER DELIVERY ONLY: ICD-10-PCS | Mod: ,,, | Performed by: MIDWIFE

## 2023-09-01 PROCEDURE — 99999 PR PBB SHADOW E&M-EST. PATIENT-LVL II: CPT | Mod: PBBFAC,,, | Performed by: MIDWIFE

## 2023-09-01 PROCEDURE — 99999 PR PBB SHADOW E&M-EST. PATIENT-LVL II: ICD-10-PCS | Mod: PBBFAC,,, | Performed by: MIDWIFE

## 2023-09-01 PROCEDURE — 99212 OFFICE O/P EST SF 10 MIN: CPT | Mod: PBBFAC | Performed by: MIDWIFE

## 2023-09-01 NOTE — PROGRESS NOTES
"Subjective:       Naseem Moura is a 32 y.o. female who presents for a postpartum visit. She is 4 weeks postpartum following a spontaneous vaginal delivery. I have fully reviewed the prenatal and intrapartum course. The delivery was at 39 gestational weeks. Outcome: spontaneous vaginal delivery. Anesthesia: epidural. Postpartum course has been unremarkable. Baby's course has been unremarkable. Baby is feeding by both breast and bottle - Similac Advance. Bleeding thin lochia. Bowel function is normal. Bladder function is normal. Patient is not sexually active. Contraception method is abstinence. Postpartum depression screening: negative.    The following portions of the patient's history were reviewed and updated as appropriate: allergies, current medications, past family history, past medical history, past social history, past surgical history, and problem list.    Review of Systems  A comprehensive review of systems was negative.     Objective:      /78   Ht 5' 3" (1.6 m)   Wt 120.8 kg (266 lb 5.1 oz)   LMP 10/15/2022   Breastfeeding Yes Comment: bottle feeding also  BMI 47.18 kg/m²    General:  alert, appears stated age, cooperative, and no distress          Assessment:      Routine postpartum exam. Pap smear not done at today's visit.     Plan:      1. Contraception: abstinence  2. Follow up in: 1  year  or as needed.     "